# Patient Record
Sex: FEMALE | Race: WHITE | NOT HISPANIC OR LATINO | Employment: OTHER | URBAN - NONMETROPOLITAN AREA
[De-identification: names, ages, dates, MRNs, and addresses within clinical notes are randomized per-mention and may not be internally consistent; named-entity substitution may affect disease eponyms.]

---

## 2022-03-11 ENCOUNTER — HOSPITAL ENCOUNTER (INPATIENT)
Facility: HOSPITAL | Age: 52
LOS: 1 days | Discharge: HOME | End: 2022-03-13
Attending: EMERGENCY MEDICINE | Admitting: SURGERY

## 2022-03-11 ENCOUNTER — APPOINTMENT (OUTPATIENT)
Dept: ULTRASOUND IMAGING | Facility: HOSPITAL | Age: 52
End: 2022-03-11

## 2022-03-11 ENCOUNTER — APPOINTMENT (OUTPATIENT)
Dept: CT IMAGING | Facility: HOSPITAL | Age: 52
End: 2022-03-11

## 2022-03-11 ENCOUNTER — APPOINTMENT (OUTPATIENT)
Dept: GENERAL RADIOLOGY | Facility: HOSPITAL | Age: 52
End: 2022-03-11

## 2022-03-11 DIAGNOSIS — K81.0 ACUTE CHOLECYSTITIS: Primary | ICD-10-CM

## 2022-03-11 LAB
ALBUMIN SERPL-MCNC: 4.39 G/DL (ref 3.5–5.2)
ALBUMIN/GLOB SERPL: 1 G/DL
ALP SERPL-CCNC: 196 U/L (ref 39–117)
ALT SERPL W P-5'-P-CCNC: 36 U/L (ref 1–33)
ANION GAP SERPL CALCULATED.3IONS-SCNC: 14 MMOL/L (ref 5–15)
AST SERPL-CCNC: 34 U/L (ref 1–32)
BASOPHILS # BLD AUTO: 0.02 10*3/MM3 (ref 0–0.2)
BASOPHILS NFR BLD AUTO: 0.2 % (ref 0–1.5)
BILIRUB SERPL-MCNC: 1 MG/DL (ref 0–1.2)
BUN SERPL-MCNC: 11 MG/DL (ref 6–20)
BUN/CREAT SERPL: 14.5 (ref 7–25)
CALCIUM SPEC-SCNC: 9.8 MG/DL (ref 8.6–10.5)
CHLORIDE SERPL-SCNC: 99 MMOL/L (ref 98–107)
CO2 SERPL-SCNC: 24 MMOL/L (ref 22–29)
CREAT SERPL-MCNC: 0.76 MG/DL (ref 0.57–1)
CRP SERPL-MCNC: 13.65 MG/DL (ref 0–0.5)
D DIMER PPP FEU-MCNC: 1.78 MCGFEU/ML (ref 0–0.5)
DEPRECATED RDW RBC AUTO: 41.9 FL (ref 37–54)
EGFRCR SERPLBLD CKD-EPI 2021: 95.6 ML/MIN/1.73
EOSINOPHIL # BLD AUTO: 0.07 10*3/MM3 (ref 0–0.4)
EOSINOPHIL NFR BLD AUTO: 0.6 % (ref 0.3–6.2)
ERYTHROCYTE [DISTWIDTH] IN BLOOD BY AUTOMATED COUNT: 12.8 % (ref 12.3–15.4)
ERYTHROCYTE [SEDIMENTATION RATE] IN BLOOD: 43 MM/HR (ref 0–20)
FLUAV SUBTYP SPEC NAA+PROBE: NOT DETECTED
FLUBV RNA ISLT QL NAA+PROBE: NOT DETECTED
GLOBULIN UR ELPH-MCNC: 4.3 GM/DL
GLUCOSE SERPL-MCNC: 134 MG/DL (ref 65–99)
HCT VFR BLD AUTO: 45.3 % (ref 34–46.6)
HGB BLD-MCNC: 14.8 G/DL (ref 12–15.9)
HOLD SPECIMEN: NORMAL
HOLD SPECIMEN: NORMAL
IMM GRANULOCYTES # BLD AUTO: 0.05 10*3/MM3 (ref 0–0.05)
IMM GRANULOCYTES NFR BLD AUTO: 0.4 % (ref 0–0.5)
LYMPHOCYTES # BLD AUTO: 1.38 10*3/MM3 (ref 0.7–3.1)
LYMPHOCYTES NFR BLD AUTO: 11.8 % (ref 19.6–45.3)
MAGNESIUM SERPL-MCNC: 2.1 MG/DL (ref 1.6–2.6)
MCH RBC QN AUTO: 29.1 PG (ref 26.6–33)
MCHC RBC AUTO-ENTMCNC: 32.7 G/DL (ref 31.5–35.7)
MCV RBC AUTO: 89 FL (ref 79–97)
MONOCYTES # BLD AUTO: 0.74 10*3/MM3 (ref 0.1–0.9)
MONOCYTES NFR BLD AUTO: 6.3 % (ref 5–12)
NEUTROPHILS NFR BLD AUTO: 80.7 % (ref 42.7–76)
NEUTROPHILS NFR BLD AUTO: 9.4 10*3/MM3 (ref 1.7–7)
NRBC BLD AUTO-RTO: 0 /100 WBC (ref 0–0.2)
PLATELET # BLD AUTO: 284 10*3/MM3 (ref 140–450)
PMV BLD AUTO: 10.3 FL (ref 6–12)
POTASSIUM SERPL-SCNC: 3.6 MMOL/L (ref 3.5–5.2)
PROT SERPL-MCNC: 8.7 G/DL (ref 6–8.5)
RBC # BLD AUTO: 5.09 10*6/MM3 (ref 3.77–5.28)
SARS-COV-2 RNA PNL SPEC NAA+PROBE: NOT DETECTED
SODIUM SERPL-SCNC: 137 MMOL/L (ref 136–145)
TROPONIN T SERPL-MCNC: <0.01 NG/ML (ref 0–0.03)
TROPONIN T SERPL-MCNC: <0.01 NG/ML (ref 0–0.03)
TSH SERPL DL<=0.05 MIU/L-ACNC: 0.51 UIU/ML (ref 0.27–4.2)
WBC NRBC COR # BLD: 11.66 10*3/MM3 (ref 3.4–10.8)
WHOLE BLOOD HOLD SPECIMEN: NORMAL
WHOLE BLOOD HOLD SPECIMEN: NORMAL

## 2022-03-11 PROCEDURE — 85379 FIBRIN DEGRADATION QUANT: CPT | Performed by: NURSE PRACTITIONER

## 2022-03-11 PROCEDURE — 71275 CT ANGIOGRAPHY CHEST: CPT

## 2022-03-11 PROCEDURE — 71045 X-RAY EXAM CHEST 1 VIEW: CPT

## 2022-03-11 PROCEDURE — 84484 ASSAY OF TROPONIN QUANT: CPT | Performed by: NURSE PRACTITIONER

## 2022-03-11 PROCEDURE — 25010000002 MORPHINE PER 10 MG: Performed by: NURSE PRACTITIONER

## 2022-03-11 PROCEDURE — 83735 ASSAY OF MAGNESIUM: CPT | Performed by: NURSE PRACTITIONER

## 2022-03-11 PROCEDURE — 86140 C-REACTIVE PROTEIN: CPT | Performed by: NURSE PRACTITIONER

## 2022-03-11 PROCEDURE — 0 IOPAMIDOL PER 1 ML: Performed by: EMERGENCY MEDICINE

## 2022-03-11 PROCEDURE — 93005 ELECTROCARDIOGRAM TRACING: CPT | Performed by: EMERGENCY MEDICINE

## 2022-03-11 PROCEDURE — 25010000002 ONDANSETRON PER 1 MG: Performed by: NURSE PRACTITIONER

## 2022-03-11 PROCEDURE — 99284 EMERGENCY DEPT VISIT MOD MDM: CPT

## 2022-03-11 PROCEDURE — 80050 GENERAL HEALTH PANEL: CPT | Performed by: NURSE PRACTITIONER

## 2022-03-11 PROCEDURE — 93005 ELECTROCARDIOGRAM TRACING: CPT | Performed by: FAMILY MEDICINE

## 2022-03-11 PROCEDURE — 84436 ASSAY OF TOTAL THYROXINE: CPT | Performed by: NURSE PRACTITIONER

## 2022-03-11 PROCEDURE — 87636 SARSCOV2 & INF A&B AMP PRB: CPT | Performed by: NURSE PRACTITIONER

## 2022-03-11 PROCEDURE — 85652 RBC SED RATE AUTOMATED: CPT | Performed by: NURSE PRACTITIONER

## 2022-03-11 PROCEDURE — 76705 ECHO EXAM OF ABDOMEN: CPT

## 2022-03-11 RX ORDER — MORPHINE SULFATE 2 MG/ML
2 INJECTION, SOLUTION INTRAMUSCULAR; INTRAVENOUS ONCE
Status: COMPLETED | OUTPATIENT
Start: 2022-03-11 | End: 2022-03-11

## 2022-03-11 RX ORDER — SODIUM CHLORIDE 0.9 % (FLUSH) 0.9 %
10 SYRINGE (ML) INJECTION AS NEEDED
Status: DISCONTINUED | OUTPATIENT
Start: 2022-03-11 | End: 2022-03-13 | Stop reason: HOSPADM

## 2022-03-11 RX ORDER — ONDANSETRON 2 MG/ML
4 INJECTION INTRAMUSCULAR; INTRAVENOUS ONCE
Status: COMPLETED | OUTPATIENT
Start: 2022-03-11 | End: 2022-03-11

## 2022-03-11 RX ORDER — ASPIRIN 81 MG/1
324 TABLET, CHEWABLE ORAL ONCE
Status: COMPLETED | OUTPATIENT
Start: 2022-03-11 | End: 2022-03-11

## 2022-03-11 RX ADMIN — MORPHINE SULFATE 2 MG: 2 INJECTION, SOLUTION INTRAMUSCULAR; INTRAVENOUS at 22:36

## 2022-03-11 RX ADMIN — ASPIRIN 81 MG: 81 TABLET, CHEWABLE ORAL at 20:35

## 2022-03-11 RX ADMIN — ONDANSETRON 4 MG: 2 INJECTION INTRAMUSCULAR; INTRAVENOUS at 22:36

## 2022-03-11 RX ADMIN — IOPAMIDOL 70 ML: 755 INJECTION, SOLUTION INTRAVENOUS at 22:19

## 2022-03-12 ENCOUNTER — ANESTHESIA (OUTPATIENT)
Dept: PERIOP | Facility: HOSPITAL | Age: 52
End: 2022-03-12

## 2022-03-12 ENCOUNTER — APPOINTMENT (OUTPATIENT)
Dept: GENERAL RADIOLOGY | Facility: HOSPITAL | Age: 52
End: 2022-03-12

## 2022-03-12 ENCOUNTER — ANESTHESIA EVENT (OUTPATIENT)
Dept: PERIOP | Facility: HOSPITAL | Age: 52
End: 2022-03-12

## 2022-03-12 PROBLEM — K81.0 ACUTE CHOLECYSTITIS: Status: ACTIVE | Noted: 2022-03-12

## 2022-03-12 LAB
ABO GROUP BLD: NORMAL
ABO GROUP BLD: NORMAL
BLD GP AB SCN SERPL QL: NEGATIVE
QT INTERVAL: 370 MS
QTC INTERVAL: 464 MS
RH BLD: POSITIVE
RH BLD: POSITIVE
T&S EXPIRATION DATE: NORMAL
T4 SERPL-MCNC: 8.69 MCG/DL (ref 4.5–11.7)

## 2022-03-12 PROCEDURE — 86900 BLOOD TYPING SEROLOGIC ABO: CPT

## 2022-03-12 PROCEDURE — C9290 INJ, BUPIVACAINE LIPOSOME: HCPCS | Performed by: NURSE ANESTHETIST, CERTIFIED REGISTERED

## 2022-03-12 PROCEDURE — C1889 IMPLANT/INSERT DEVICE, NOC: HCPCS | Performed by: SURGERY

## 2022-03-12 PROCEDURE — 94799 UNLISTED PULMONARY SVC/PX: CPT

## 2022-03-12 PROCEDURE — 25010000002 DEXAMETHASONE PER 1 MG: Performed by: NURSE ANESTHETIST, CERTIFIED REGISTERED

## 2022-03-12 PROCEDURE — 25010000002 PROPOFOL 10 MG/ML EMULSION: Performed by: NURSE ANESTHETIST, CERTIFIED REGISTERED

## 2022-03-12 PROCEDURE — 25010000002 PIPERACILLIN SOD-TAZOBACTAM PER 1 G: Performed by: SURGERY

## 2022-03-12 PROCEDURE — 86901 BLOOD TYPING SEROLOGIC RH(D): CPT

## 2022-03-12 PROCEDURE — 25010000002 FENTANYL CITRATE (PF) 50 MCG/ML SOLUTION: Performed by: NURSE ANESTHETIST, CERTIFIED REGISTERED

## 2022-03-12 PROCEDURE — 47562 LAPAROSCOPIC CHOLECYSTECTOMY: CPT | Performed by: SURGERY

## 2022-03-12 PROCEDURE — 25010000002 HYDROMORPHONE 1 MG/ML SOLUTION: Performed by: NURSE PRACTITIONER

## 2022-03-12 PROCEDURE — 99222 1ST HOSP IP/OBS MODERATE 55: CPT | Performed by: SURGERY

## 2022-03-12 PROCEDURE — 25010000002 PROCHLORPERAZINE 10 MG/2ML SOLUTION: Performed by: NURSE PRACTITIONER

## 2022-03-12 PROCEDURE — 86850 RBC ANTIBODY SCREEN: CPT | Performed by: NURSE PRACTITIONER

## 2022-03-12 PROCEDURE — 0 CEFAZOLIN SODIUM-DEXTROSE 2-3 GM-%(50ML) RECONSTITUTED SOLUTION: Performed by: SURGERY

## 2022-03-12 PROCEDURE — 25010000002 ONDANSETRON PER 1 MG: Performed by: NURSE PRACTITIONER

## 2022-03-12 PROCEDURE — 25010000002 KETOROLAC TROMETHAMINE PER 15 MG: Performed by: SURGERY

## 2022-03-12 PROCEDURE — 0 BUPIVACAINE LIPOSOME 1.3 % SUSPENSION: Performed by: NURSE ANESTHETIST, CERTIFIED REGISTERED

## 2022-03-12 PROCEDURE — 94640 AIRWAY INHALATION TREATMENT: CPT

## 2022-03-12 PROCEDURE — 25010000002 MORPHINE PER 10 MG: Performed by: NURSE PRACTITIONER

## 2022-03-12 PROCEDURE — 86901 BLOOD TYPING SEROLOGIC RH(D): CPT | Performed by: NURSE PRACTITIONER

## 2022-03-12 PROCEDURE — 88304 TISSUE EXAM BY PATHOLOGIST: CPT | Performed by: SURGERY

## 2022-03-12 PROCEDURE — 0FT44ZZ RESECTION OF GALLBLADDER, PERCUTANEOUS ENDOSCOPIC APPROACH: ICD-10-PCS | Performed by: SURGERY

## 2022-03-12 PROCEDURE — 25010000002 NEOSTIGMINE 10 MG/10ML SOLUTION: Performed by: NURSE ANESTHETIST, CERTIFIED REGISTERED

## 2022-03-12 PROCEDURE — 86900 BLOOD TYPING SEROLOGIC ABO: CPT | Performed by: NURSE PRACTITIONER

## 2022-03-12 DEVICE — CLIP APPLIER
Type: IMPLANTABLE DEVICE | Site: ABDOMEN | Status: FUNCTIONAL
Brand: ENDO CLIP

## 2022-03-12 DEVICE — LIGACLIP 10-M/L, 10MM ENDOSCOPIC ROTATING MULTIPLE CLIP APPLIERS
Type: IMPLANTABLE DEVICE | Site: ABDOMEN | Status: FUNCTIONAL
Brand: LIGACLIP

## 2022-03-12 DEVICE — THE ECHELON, ECHELON ENDOPATH™ AND ECHELON FLEX™ FAMILIES OF ENDOSCOPIC LINEAR CUTTERS AND RELOADS ARE STERILE, SINGLE PATIENT USE INSTRUMENTS THAT SIMULTANEOUSLY CUT AND STAPLE TISSUE. THERE ARE SIX STAGGERED ROWS OF STAPLES, THREE ON EITHER SIDE OF THE CUT LINE. THE 45 MM INSTRUMENTS HAVE A STAPLE LINE THATIS APPROXIMATELY 45 MM LONG AND A CUT LINE THAT IS APPROXIMATELY 42 MM LONG. THE SHAFT CAN ROTATE FREELY IN BOTH DIRECTIONS AND AN ARTICULATION MECHANISM ON ARTICULATING INSTRUMENTS ENABLES BENDING THE DISTAL PORTIONOF THE SHAFT TO FACILITATE LATERAL ACCESS OF THE OPERATIVE SITE.THE INSTRUMENTS ARE SHIPPED WITHOUT A RELOAD AND MUST BE LOADED PRIOR TO USE. A STAPLE RETAINING CAP ON THE RELOAD PROTECTS THE STAPLE LEG POINTS DURING SHIPPING AND TRANSPORTATION. THE INSTRUMENTS’ LOCK-OUT FEATURE IS DESIGNED TO PREVENT A USED RELOAD FROM BEING REFIRED.
Type: IMPLANTABLE DEVICE | Site: ABDOMEN | Status: FUNCTIONAL
Brand: ECHELON ENDOPATH

## 2022-03-12 RX ORDER — SODIUM CHLORIDE 0.9 % (FLUSH) 0.9 %
10 SYRINGE (ML) INJECTION EVERY 12 HOURS SCHEDULED
Status: DISCONTINUED | OUTPATIENT
Start: 2022-03-12 | End: 2022-03-13 | Stop reason: HOSPADM

## 2022-03-12 RX ORDER — IPRATROPIUM BROMIDE AND ALBUTEROL SULFATE 2.5; .5 MG/3ML; MG/3ML
3 SOLUTION RESPIRATORY (INHALATION) ONCE AS NEEDED
Status: COMPLETED | OUTPATIENT
Start: 2022-03-12 | End: 2022-03-12

## 2022-03-12 RX ORDER — DEXAMETHASONE SODIUM PHOSPHATE 4 MG/ML
INJECTION, SOLUTION INTRA-ARTICULAR; INTRALESIONAL; INTRAMUSCULAR; INTRAVENOUS; SOFT TISSUE AS NEEDED
Status: DISCONTINUED | OUTPATIENT
Start: 2022-03-12 | End: 2022-03-12 | Stop reason: SURG

## 2022-03-12 RX ORDER — KETOROLAC TROMETHAMINE 30 MG/ML
15 INJECTION, SOLUTION INTRAMUSCULAR; INTRAVENOUS EVERY 6 HOURS
Status: DISCONTINUED | OUTPATIENT
Start: 2022-03-12 | End: 2022-03-13 | Stop reason: HOSPADM

## 2022-03-12 RX ORDER — NEOSTIGMINE METHYLSULFATE 1 MG/ML
INJECTION, SOLUTION INTRAVENOUS AS NEEDED
Status: DISCONTINUED | OUTPATIENT
Start: 2022-03-12 | End: 2022-03-12 | Stop reason: SURG

## 2022-03-12 RX ORDER — FLUOXETINE HYDROCHLORIDE 40 MG/1
40 CAPSULE ORAL DAILY
COMMUNITY

## 2022-03-12 RX ORDER — DROPERIDOL 2.5 MG/ML
0.62 INJECTION, SOLUTION INTRAMUSCULAR; INTRAVENOUS ONCE AS NEEDED
Status: DISCONTINUED | OUTPATIENT
Start: 2022-03-12 | End: 2022-03-12 | Stop reason: HOSPADM

## 2022-03-12 RX ORDER — MAGNESIUM HYDROXIDE 1200 MG/15ML
LIQUID ORAL AS NEEDED
Status: DISCONTINUED | OUTPATIENT
Start: 2022-03-12 | End: 2022-03-12 | Stop reason: HOSPADM

## 2022-03-12 RX ORDER — SODIUM CHLORIDE 9 MG/ML
INJECTION, SOLUTION INTRAVENOUS CONTINUOUS PRN
Status: COMPLETED | OUTPATIENT
Start: 2022-03-12 | End: 2022-03-12

## 2022-03-12 RX ORDER — PROPOFOL 10 MG/ML
VIAL (ML) INTRAVENOUS AS NEEDED
Status: DISCONTINUED | OUTPATIENT
Start: 2022-03-12 | End: 2022-03-12 | Stop reason: SURG

## 2022-03-12 RX ORDER — FENTANYL CITRATE 50 UG/ML
50 INJECTION, SOLUTION INTRAMUSCULAR; INTRAVENOUS
Status: DISCONTINUED | OUTPATIENT
Start: 2022-03-12 | End: 2022-03-12 | Stop reason: HOSPADM

## 2022-03-12 RX ORDER — VECURONIUM BROMIDE 1 MG/ML
INJECTION, POWDER, LYOPHILIZED, FOR SOLUTION INTRAVENOUS AS NEEDED
Status: DISCONTINUED | OUTPATIENT
Start: 2022-03-12 | End: 2022-03-12 | Stop reason: SURG

## 2022-03-12 RX ORDER — SODIUM CHLORIDE, SODIUM LACTATE, POTASSIUM CHLORIDE, CALCIUM CHLORIDE 600; 310; 30; 20 MG/100ML; MG/100ML; MG/100ML; MG/100ML
100 INJECTION, SOLUTION INTRAVENOUS ONCE AS NEEDED
Status: DISCONTINUED | OUTPATIENT
Start: 2022-03-12 | End: 2022-03-12 | Stop reason: HOSPADM

## 2022-03-12 RX ORDER — MIRTAZAPINE 15 MG/1
30 TABLET, FILM COATED ORAL NIGHTLY
Status: DISCONTINUED | OUTPATIENT
Start: 2022-03-12 | End: 2022-03-13 | Stop reason: HOSPADM

## 2022-03-12 RX ORDER — CEFAZOLIN SODIUM 2 G/50ML
2 SOLUTION INTRAVENOUS
Status: COMPLETED | OUTPATIENT
Start: 2022-03-13 | End: 2022-03-12

## 2022-03-12 RX ORDER — SODIUM CHLORIDE, SODIUM LACTATE, POTASSIUM CHLORIDE, CALCIUM CHLORIDE 600; 310; 30; 20 MG/100ML; MG/100ML; MG/100ML; MG/100ML
INJECTION, SOLUTION INTRAVENOUS CONTINUOUS PRN
Status: DISCONTINUED | OUTPATIENT
Start: 2022-03-12 | End: 2022-03-12 | Stop reason: SURG

## 2022-03-12 RX ORDER — FAMOTIDINE 20 MG/1
20 TABLET, FILM COATED ORAL
Status: DISCONTINUED | OUTPATIENT
Start: 2022-03-12 | End: 2022-03-13 | Stop reason: HOSPADM

## 2022-03-12 RX ORDER — LIDOCAINE HYDROCHLORIDE 20 MG/ML
INJECTION, SOLUTION INFILTRATION; PERINEURAL AS NEEDED
Status: DISCONTINUED | OUTPATIENT
Start: 2022-03-12 | End: 2022-03-12 | Stop reason: SURG

## 2022-03-12 RX ORDER — ROCURONIUM BROMIDE 10 MG/ML
INJECTION, SOLUTION INTRAVENOUS AS NEEDED
Status: DISCONTINUED | OUTPATIENT
Start: 2022-03-12 | End: 2022-03-12 | Stop reason: SURG

## 2022-03-12 RX ORDER — OXYCODONE HYDROCHLORIDE AND ACETAMINOPHEN 5; 325 MG/1; MG/1
1 TABLET ORAL ONCE AS NEEDED
Status: DISCONTINUED | OUTPATIENT
Start: 2022-03-12 | End: 2022-03-12 | Stop reason: HOSPADM

## 2022-03-12 RX ORDER — KETOROLAC TROMETHAMINE 30 MG/ML
30 INJECTION, SOLUTION INTRAMUSCULAR; INTRAVENOUS EVERY 6 HOURS PRN
Status: DISCONTINUED | OUTPATIENT
Start: 2022-03-12 | End: 2022-03-12 | Stop reason: HOSPADM

## 2022-03-12 RX ORDER — PROCHLORPERAZINE EDISYLATE 5 MG/ML
5 INJECTION INTRAMUSCULAR; INTRAVENOUS ONCE
Status: COMPLETED | OUTPATIENT
Start: 2022-03-12 | End: 2022-03-12

## 2022-03-12 RX ORDER — MEPERIDINE HYDROCHLORIDE 25 MG/ML
12.5 INJECTION INTRAMUSCULAR; INTRAVENOUS; SUBCUTANEOUS
Status: DISCONTINUED | OUTPATIENT
Start: 2022-03-12 | End: 2022-03-12 | Stop reason: HOSPADM

## 2022-03-12 RX ORDER — POLYETHYLENE GLYCOL 3350 17 G/17G
17 POWDER, FOR SOLUTION ORAL 2 TIMES DAILY
Status: DISCONTINUED | OUTPATIENT
Start: 2022-03-12 | End: 2022-03-13 | Stop reason: HOSPADM

## 2022-03-12 RX ORDER — GLYCOPYRROLATE 0.2 MG/ML
INJECTION INTRAMUSCULAR; INTRAVENOUS AS NEEDED
Status: DISCONTINUED | OUTPATIENT
Start: 2022-03-12 | End: 2022-03-12 | Stop reason: SURG

## 2022-03-12 RX ORDER — MORPHINE SULFATE 2 MG/ML
2 INJECTION, SOLUTION INTRAMUSCULAR; INTRAVENOUS
Status: DISCONTINUED | OUTPATIENT
Start: 2022-03-12 | End: 2022-03-13 | Stop reason: HOSPADM

## 2022-03-12 RX ORDER — HEPARIN SODIUM 5000 [USP'U]/ML
5000 INJECTION, SOLUTION INTRAVENOUS; SUBCUTANEOUS EVERY 12 HOURS SCHEDULED
Status: DISCONTINUED | OUTPATIENT
Start: 2022-03-13 | End: 2022-03-13 | Stop reason: HOSPADM

## 2022-03-12 RX ORDER — ACETAMINOPHEN 500 MG
1000 TABLET ORAL EVERY 6 HOURS
Status: DISCONTINUED | OUTPATIENT
Start: 2022-03-12 | End: 2022-03-13 | Stop reason: HOSPADM

## 2022-03-12 RX ORDER — SODIUM CHLORIDE 0.9 % (FLUSH) 0.9 %
3 SYRINGE (ML) INJECTION EVERY 12 HOURS SCHEDULED
Status: DISCONTINUED | OUTPATIENT
Start: 2022-03-12 | End: 2022-03-13 | Stop reason: HOSPADM

## 2022-03-12 RX ORDER — BUPIVACAINE HYDROCHLORIDE 5 MG/ML
INJECTION, SOLUTION EPIDURAL; INTRACAUDAL
Status: COMPLETED | OUTPATIENT
Start: 2022-03-12 | End: 2022-03-12

## 2022-03-12 RX ORDER — LABETALOL HYDROCHLORIDE 5 MG/ML
INJECTION, SOLUTION INTRAVENOUS AS NEEDED
Status: DISCONTINUED | OUTPATIENT
Start: 2022-03-12 | End: 2022-03-12 | Stop reason: SURG

## 2022-03-12 RX ORDER — MORPHINE SULFATE 2 MG/ML
1 INJECTION, SOLUTION INTRAMUSCULAR; INTRAVENOUS EVERY 4 HOURS PRN
Status: DISCONTINUED | OUTPATIENT
Start: 2022-03-12 | End: 2022-03-13 | Stop reason: HOSPADM

## 2022-03-12 RX ORDER — SODIUM CHLORIDE 0.9 % (FLUSH) 0.9 %
10 SYRINGE (ML) INJECTION AS NEEDED
Status: DISCONTINUED | OUTPATIENT
Start: 2022-03-12 | End: 2022-03-13 | Stop reason: HOSPADM

## 2022-03-12 RX ORDER — ONDANSETRON 2 MG/ML
4 INJECTION INTRAMUSCULAR; INTRAVENOUS EVERY 4 HOURS PRN
Status: DISCONTINUED | OUTPATIENT
Start: 2022-03-12 | End: 2022-03-13 | Stop reason: HOSPADM

## 2022-03-12 RX ORDER — ACETAMINOPHEN 325 MG/1
650 TABLET ORAL EVERY 4 HOURS PRN
Status: DISCONTINUED | OUTPATIENT
Start: 2022-03-12 | End: 2022-03-13 | Stop reason: HOSPADM

## 2022-03-12 RX ORDER — MIRTAZAPINE 30 MG/1
30 TABLET, FILM COATED ORAL NIGHTLY
COMMUNITY

## 2022-03-12 RX ORDER — ONDANSETRON 4 MG/1
4 TABLET, FILM COATED ORAL EVERY 6 HOURS PRN
Status: DISCONTINUED | OUTPATIENT
Start: 2022-03-12 | End: 2022-03-12

## 2022-03-12 RX ORDER — OXYCODONE HYDROCHLORIDE 5 MG/1
5 TABLET ORAL EVERY 4 HOURS PRN
Status: DISCONTINUED | OUTPATIENT
Start: 2022-03-12 | End: 2022-03-13 | Stop reason: HOSPADM

## 2022-03-12 RX ORDER — FLUOXETINE HYDROCHLORIDE 20 MG/1
40 CAPSULE ORAL DAILY
Status: DISCONTINUED | OUTPATIENT
Start: 2022-03-12 | End: 2022-03-13 | Stop reason: HOSPADM

## 2022-03-12 RX ORDER — ONDANSETRON 2 MG/ML
4 INJECTION INTRAMUSCULAR; INTRAVENOUS AS NEEDED
Status: DISCONTINUED | OUTPATIENT
Start: 2022-03-12 | End: 2022-03-12 | Stop reason: HOSPADM

## 2022-03-12 RX ORDER — FAMOTIDINE 10 MG/ML
INJECTION, SOLUTION INTRAVENOUS AS NEEDED
Status: DISCONTINUED | OUTPATIENT
Start: 2022-03-12 | End: 2022-03-12 | Stop reason: SURG

## 2022-03-12 RX ORDER — FENTANYL CITRATE 50 UG/ML
INJECTION, SOLUTION INTRAMUSCULAR; INTRAVENOUS AS NEEDED
Status: DISCONTINUED | OUTPATIENT
Start: 2022-03-12 | End: 2022-03-12 | Stop reason: SURG

## 2022-03-12 RX ORDER — NALOXONE HCL 0.4 MG/ML
0.4 VIAL (ML) INJECTION
Status: DISCONTINUED | OUTPATIENT
Start: 2022-03-12 | End: 2022-03-13 | Stop reason: HOSPADM

## 2022-03-12 RX ORDER — ONDANSETRON 2 MG/ML
4 INJECTION INTRAMUSCULAR; INTRAVENOUS EVERY 6 HOURS PRN
Status: DISCONTINUED | OUTPATIENT
Start: 2022-03-12 | End: 2022-03-12

## 2022-03-12 RX ORDER — SODIUM CHLORIDE, SODIUM LACTATE, POTASSIUM CHLORIDE, CALCIUM CHLORIDE 600; 310; 30; 20 MG/100ML; MG/100ML; MG/100ML; MG/100ML
100 INJECTION, SOLUTION INTRAVENOUS CONTINUOUS
Status: DISCONTINUED | OUTPATIENT
Start: 2022-03-12 | End: 2022-03-13 | Stop reason: HOSPADM

## 2022-03-12 RX ADMIN — LABETALOL HYDROCHLORIDE 5 MG: 5 INJECTION, SOLUTION INTRAVENOUS at 14:12

## 2022-03-12 RX ADMIN — Medication 3 ML: at 19:56

## 2022-03-12 RX ADMIN — PROPOFOL 150 MG: 10 INJECTION, EMULSION INTRAVENOUS at 11:59

## 2022-03-12 RX ADMIN — CEFAZOLIN SODIUM 2 G: 2 SOLUTION INTRAVENOUS at 11:55

## 2022-03-12 RX ADMIN — BUPIVACAINE HYDROCHLORIDE 40 ML: 5 INJECTION, SOLUTION EPIDURAL; INTRACAUDAL; PERINEURAL at 12:04

## 2022-03-12 RX ADMIN — HYDROMORPHONE HYDROCHLORIDE 1 MG: 1 INJECTION, SOLUTION INTRAMUSCULAR; INTRAVENOUS; SUBCUTANEOUS at 00:24

## 2022-03-12 RX ADMIN — FAMOTIDINE 20 MG: 20 TABLET, FILM COATED ORAL at 17:52

## 2022-03-12 RX ADMIN — ROCURONIUM BROMIDE 50 MG: 10 SOLUTION INTRAVENOUS at 11:59

## 2022-03-12 RX ADMIN — DEXAMETHASONE SODIUM PHOSPHATE 8 MG: 4 INJECTION, SOLUTION INTRA-ARTICULAR; INTRALESIONAL; INTRAMUSCULAR; INTRAVENOUS; SOFT TISSUE at 12:15

## 2022-03-12 RX ADMIN — LABETALOL HYDROCHLORIDE 5 MG: 5 INJECTION, SOLUTION INTRAVENOUS at 13:14

## 2022-03-12 RX ADMIN — BUPIVACAINE 266 MG: 13.3 INJECTION, SUSPENSION, LIPOSOMAL INFILTRATION at 12:04

## 2022-03-12 RX ADMIN — Medication 10 ML: at 00:32

## 2022-03-12 RX ADMIN — LABETALOL HYDROCHLORIDE 10 MG: 5 INJECTION, SOLUTION INTRAVENOUS at 12:11

## 2022-03-12 RX ADMIN — ONDANSETRON 4 MG: 2 INJECTION INTRAMUSCULAR; INTRAVENOUS at 12:15

## 2022-03-12 RX ADMIN — Medication 10 ML: at 19:55

## 2022-03-12 RX ADMIN — SODIUM CHLORIDE, POTASSIUM CHLORIDE, SODIUM LACTATE AND CALCIUM CHLORIDE: 600; 310; 30; 20 INJECTION, SOLUTION INTRAVENOUS at 11:55

## 2022-03-12 RX ADMIN — KETOROLAC TROMETHAMINE 15 MG: 30 INJECTION, SOLUTION INTRAMUSCULAR; INTRAVENOUS at 19:55

## 2022-03-12 RX ADMIN — NEOSTIGMINE 4 MG: 1 INJECTION INTRAVENOUS at 14:05

## 2022-03-12 RX ADMIN — Medication 3 ML: at 15:17

## 2022-03-12 RX ADMIN — SODIUM CHLORIDE, POTASSIUM CHLORIDE, SODIUM LACTATE AND CALCIUM CHLORIDE 100 ML/HR: 600; 310; 30; 20 INJECTION, SOLUTION INTRAVENOUS at 15:17

## 2022-03-12 RX ADMIN — MORPHINE SULFATE 1 MG: 2 INJECTION, SOLUTION INTRAMUSCULAR; INTRAVENOUS at 08:54

## 2022-03-12 RX ADMIN — FENTANYL CITRATE 100 MCG: 50 INJECTION INTRAMUSCULAR; INTRAVENOUS at 11:55

## 2022-03-12 RX ADMIN — POLYETHYLENE GLYCOL (3350) 17 G: 17 POWDER, FOR SOLUTION ORAL at 19:55

## 2022-03-12 RX ADMIN — LIDOCAINE HYDROCHLORIDE 100 MG: 20 INJECTION, SOLUTION INFILTRATION; PERINEURAL at 11:59

## 2022-03-12 RX ADMIN — SODIUM CHLORIDE 3.38 G: 9 INJECTION, SOLUTION INTRAVENOUS at 19:55

## 2022-03-12 RX ADMIN — MORPHINE SULFATE 1 MG: 2 INJECTION, SOLUTION INTRAMUSCULAR; INTRAVENOUS at 05:14

## 2022-03-12 RX ADMIN — Medication 10 ML: at 08:54

## 2022-03-12 RX ADMIN — KETOROLAC TROMETHAMINE 15 MG: 30 INJECTION, SOLUTION INTRAMUSCULAR; INTRAVENOUS at 15:20

## 2022-03-12 RX ADMIN — MIRTAZAPINE 30 MG: 15 TABLET, FILM COATED ORAL at 19:54

## 2022-03-12 RX ADMIN — IPRATROPIUM BROMIDE AND ALBUTEROL SULFATE 3 ML: .5; 2.5 SOLUTION RESPIRATORY (INHALATION) at 14:32

## 2022-03-12 RX ADMIN — ONDANSETRON 4 MG: 2 INJECTION INTRAMUSCULAR; INTRAVENOUS at 03:11

## 2022-03-12 RX ADMIN — PROCHLORPERAZINE EDISYLATE 5 MG: 5 INJECTION INTRAMUSCULAR; INTRAVENOUS at 00:24

## 2022-03-12 RX ADMIN — VECURONIUM BROMIDE 2 MG: 1 INJECTION, POWDER, LYOPHILIZED, FOR SOLUTION INTRAVENOUS at 13:18

## 2022-03-12 RX ADMIN — FAMOTIDINE 20 MG: 10 INJECTION INTRAVENOUS at 12:15

## 2022-03-12 RX ADMIN — MORPHINE SULFATE 1 MG: 2 INJECTION, SOLUTION INTRAMUSCULAR; INTRAVENOUS at 01:42

## 2022-03-12 RX ADMIN — ACETAMINOPHEN 1000 MG: 500 TABLET ORAL at 15:20

## 2022-03-12 RX ADMIN — GLYCOPYRROLATE 0.6 MG: 0.2 INJECTION INTRAMUSCULAR; INTRAVENOUS at 14:05

## 2022-03-12 RX ADMIN — ACETAMINOPHEN 1000 MG: 500 TABLET ORAL at 19:55

## 2022-03-12 NOTE — ANESTHESIA PROCEDURE NOTES
Peripheral Block      Patient reassessed immediately prior to procedure    Patient location during procedure: OR  Start time: 3/12/2022 12:04 PM  Stop time: 3/12/2022 12:11 PM  Reason for block: at surgeon's request and post-op pain management  Performed by  Anesthesiologist: Андрей Limon DO  CRNA: Naa Garcia CRNA  Preanesthetic Checklist  Completed: patient identified, IV checked, site marked, risks and benefits discussed, surgical consent, monitors and equipment checked, pre-op evaluation and timeout performed  Prep:  Pt Position: supine  Sterile barriers:cap, gloves, sterile barriers and mask  Prep: ChloraPrep  Patient monitoring: blood pressure monitoring, continuous pulse oximetry and EKG  Procedure    Sedation: yes  Performed under: general  Guidance:ultrasound guided    ULTRASOUND INTERPRETATION. Using ultrasound guidance a 20 G gauge needle was placed in close proximity to the nerve, at which point, under ultrasound guidance anesthetic was injected in the area of the nerve and spread of the anesthesia was seen on ultrasound in close proximity thereto.  There were no abnormalities seen on ultrasound; a digital image was taken; and the patient tolerated the procedure with no complications. Images:still images obtained, printed/placed on chart    Laterality:Bilateral  Block Type:TAP  Injection Technique:single-shot  Needle Type:short-bevel and echogenic  Needle Gauge:20 G  Resistance on Injection: none    Medications Used: bupivacaine PF (MARCAINE) injection 0.5%, 40 mL  Med administered at 3/12/2022 12:04 PM      Medications  Preservative Free Saline:20ml  Comment:Block Injection:  LA dose divided between Right and Left block        Post Assessment  Injection Assessment: negative aspiration for heme, incremental injection and no paresthesia on injection  Patient Tolerance:comfortable throughout block  Complications:no  Additional Notes  SC:    Under Ultrasound guidance, a Denise 4inch 360 degree  needle was advanced.  The Rectus Abdominous and Transversus Abdominus muscles where visualized.  At or before the aponeurosis of Transversus Abdominous, local anesthetic spread was visualized in the Transversus Abdominus Plane. Injection was made incrementally with aspiration every 5 mls.  There was no  intravascular injection,  injection pressure was normal, there was no neural injection, and the procedure was completed without difficulty.  Thank You.            Mid ax:  Under Ultrasound guidance, a BBraun 4inch 360 degree needle was advanced.  The Internal Oblique and Transversus Abdominus muscles where visualized.  At or before the aponeurosis of Internal Oblique, local anesthetic spread was visualized in the Transversus Abdominus Plane. Injection was made incrementally with aspiration every 5 mls.  There was no  intravascular injection,  injection pressure was normal, there was no neural injection, and the procedure was completed without difficulty.  Thank You.

## 2022-03-12 NOTE — ANESTHESIA PREPROCEDURE EVALUATION
Anesthesia Evaluation     Patient summary reviewed and Nursing notes reviewed   no history of anesthetic complications:  NPO Solid Status: > 8 hours  NPO Liquid Status: > 8 hours           Airway   Mallampati: II  TM distance: >3 FB  Neck ROM: full  No difficulty expected  Dental    (+) poor dentition    Pulmonary - normal exam    breath sounds clear to auscultation  (+) a smoker Current Abstained day of surgery,   Cardiovascular - negative cardio ROS and normal exam    ECG reviewed  Rhythm: regular  Rate: normal        Neuro/Psych  (+) psychiatric history Depression,    GI/Hepatic/Renal/Endo    (+) obesity, morbid obesity,      Musculoskeletal (-) negative ROS    Abdominal   (+) obese,    Substance History - negative use     OB/GYN negative ob/gyn ROS         Other - negative ROS                     Anesthesia Plan    ASA 2     general and general with block   (TAP blocks for post op pain control per surgeon request. )  intravenous induction     Anesthetic plan, all risks, benefits, and alternatives have been provided, discussed and informed consent has been obtained with: patient.    Plan discussed with CRNA.        CODE STATUS:    Level Of Support Discussed With: Patient  Code Status (Patient has no pulse and is not breathing): CPR (Attempt to Resuscitate)  Medical Interventions (Patient has pulse or is breathing): Full Support

## 2022-03-12 NOTE — H&P
Chief complaint cholelithiasis, cholecystitis    Subjective     Patient is a 51 y.o. female who presented to the ED with complaints of epigastric pain and right upper quadrant pain with nausea.  Work-up demonstrated noncardiac source.  CT and ultrasound were compatible with acute cholecystitis with cholelithiasis.  Patient was not aware that she had gallstones.      Review of Systems  Review of Systems - General ROS: negative for - weight loss  Psychological ROS: negative for - behavioral disorder  Ophthalmic ROS: negative for - dry eyes  ENT ROS: negative for - vertigo or vocal changes  Hematological and Lymphatic ROS: negative for - jaundice or swollen lymph nodes  Respiratory ROS: negative for - sputum changes or stridor, shortness of breath  Cardiovascular ROS: negative for - irregular heartbeat or murmur, chest pain  Gastrointestinal ROS: negative for - blood in stools or change in stools  Genitourinary ROS: negative for - hematuria or incontinence  Musculoskeletal ROS: negative for - gait disturbance      History  Past Medical History:   Diagnosis Date   • Depression    Negative for hypertension, diabetes  Past Surgical History:   Procedure Laterality Date   • APPENDECTOMY       No family history on file.  Social History     Tobacco Use   • Smoking status: Current Every Day Smoker     Packs/day: 1.00     Years: 15.00     Pack years: 15.00     Types: Cigarettes   • Smokeless tobacco: Never Used   Vaping Use   • Vaping Use: Never used   Substance Use Topics   • Alcohol use: Never   • Drug use: Never     Medications Prior to Admission   Medication Sig Dispense Refill Last Dose   • FLUoxetine (PROzac) 40 MG capsule Take 40 mg by mouth Daily.   Past Week at Unknown time   • mirtazapine (REMERON) 30 MG tablet Take 30 mg by mouth Every Night.   Past Week at Unknown time     Allergies:  Erythromycin    Objective     Vital Signs  Temp:  [98.1 °F (36.7 °C)-99.4 °F (37.4 °C)] 99.4 °F (37.4 °C)  Heart Rate:   [] 110  Resp:  [20] 20  BP: (159-174)/(86-99) 174/99    Physical Exam  General:  This is a WD WN overweight female in no acute distress  Vital signs: Stable, afebrile  HEENT exam:  WNL. Sclerae are anicteric.  EOMI  Neck:  Supple, FROM.  No JVD.  Trachea midline  Lungs:  Respiratory effort normal. Auscultation: Clear, without wheezes, rhonchi, rales  Heart:  Regular rate and rhythm, without murmur, gallop, rub.  No pedal edema  Abdomen: Bowel sounds are present.  Positive Sepulveda sign.  No palpable mass  Musculoskeletal:  Muscle strength/tone is normal.    Psychiatric:  Alert, oriented x 3.  Mood and affect are appropriate  Skin:  Warm with good turgor.  Without rash or lesion  Extremities:  Examination of the extremities revealed no cyanosis, clubbing or edema.    Results Review:   Results from last 7 days   Lab Units 03/11/22  2252 03/11/22 2034   TROPONIN T ng/mL <0.010 <0.010     Results from last 7 days   Lab Units 03/11/22 2034   CRP mg/dL 13.65*   WBC 10*3/mm3 11.66*   HEMOGLOBIN g/dL 14.8   HEMATOCRIT % 45.3   PLATELETS 10*3/mm3 284         Results from last 7 days   Lab Units 03/11/22 2034   SODIUM mmol/L 137   POTASSIUM mmol/L 3.6   MAGNESIUM mg/dL 2.1   CHLORIDE mmol/L 99   CO2 mmol/L 24.0   BUN mg/dL 11   CREATININE mg/dL 0.76   CALCIUM mg/dL 9.8   GLUCOSE mg/dL 134*   ALBUMIN g/dL 4.39   BILIRUBIN mg/dL 1.0   ALK PHOS U/L 196*   AST (SGOT) U/L 34*   ALT (SGPT) U/L 36*   Estimated Creatinine Clearance: 94.2 mL/min (by C-G formula based on SCr of 0.76 mg/dL).  No results found for: AMMONIA      No results found for: BLOODCX  No results found for: URINECX  No results found for: WOUNDCX  No results found for: STOOLCX    Imaging:  Imaging Results (Last 24 Hours)     Procedure Component Value Units Date/Time    US Gallbladder [[id number]] Collected: 03/11/22 2356     Updated: 03/11/22 2358    Narrative:      US Abdomen Ltd    INDICATION:   Upper abdominal chest pain radiating to back and upper abdomen  all day. Abnormal CT scan earlier today suggesting cholecystitis    COMPARISON:   None available.    FINDINGS:  PANCREAS: Visualized portions of the pancreas are within normal limits.     LIVER: Increased echogenicity of the liver is indicative of hepatic steatosis.. No hepatic mass. The intrahepatic bile ducts are normal in caliber. The common duct is normal in size at the jose hepatis measuring 7.6 mm mm. The portal vein has flow into  the liver.    GALLBLADDER: The gallbladder is abnormal. There is minimal fluid surrounding the gallbladder. Wall is thickened measuring up to 8 mm in thickness. The gallbladder filled with sludge and small stones. Patient was tender over the gallbladder        OTHER: No ascites.      Impression:      Findings suggestive of cholecystitis with a distended gallbladder containing sludge and stones. There is wall thickening. Patient was tender over the gallbladder. There is minimal fluid around the gallbladder    Signer Name: Alexandre Luna MD   Signed: 3/11/2022 11:56 PM   Workstation Name: [facility]    [facility]    CT Chest Pulmonary Embolism [[id number]] Collected: 03/11/22 2233     Updated: 03/11/22 2235    Narrative:      CT CHEST PULMONARY EMBOLISM W CONTRAST    INDICATION:   Chest pain. Possible PE.    TECHNIQUE:   CT angiogram of the chest with IV contrast. 3-D reconstructions were obtained and reviewed.   Radiation dose reduction techniques included automated exposure control or exposure modulation based on body size. Count of known CT and cardiac nuc med studies  performed in previous 12 months: 0.     COMPARISON:   Portable chest 3/11/2022    FINDINGS:   Multifocal linear opacities compatible subsegmental atelectasis. No focal pneumonitis. No effusions.    No evidence of pulmonary embolus. Cardiomegaly. Aorta free of dissection or aneurysm. No central mass or adenopathy.    Markedly distended gallbladder with suspected gallbladder wall thickening and  pericholecystic fluid with cholesterol gallstones. Correlate clinically for acute cholecystitis.      Impression:      Mild multifocal atelectasis but no focal air space disease and no evidence of pulmonary embolus.    CT findings concerning for acute cholecystitis.    Signer Name: INDY Burton MD   Signed: 3/11/2022 10:33 PM   Workstation Name: [facility]    [facility]    XR Chest 1 View [[id number]] Collected: 03/11/22 2033     Updated: 03/11/22 2035    Narrative:      CR Chest 1 Vw    INDICATION:   Chest pain.     COMPARISON:    None available.    FINDINGS:  Portable AP view(s) of the chest.  The heart and mediastinal contours are normal. The lungs are clear. No pneumothorax or pleural effusion.      Impression:      No acute cardiopulmonary findings.    Signer Name: INDY Burton MD   Signed: 3/11/2022 8:33 PM   Workstation Name: [facility]    [facility]          CT and ultrasound reports and images were reviewed.  I agree with the assessment      Impression:  Patient Active Problem List   Diagnosis Code   • Acute cholecystitis K81.0       Plan:  Laparoscopic cholecystectomy      Discussion:  The risks of the surgical procedure were discussed.  Options of alternative treatments including no treatment (if applicable) were discussed.  Patient voiced understanding of the above issues and wishes to proceed    Sanaz Jones MD  03/12/22  09:58 EST      Please note that portions of this note were completed with a voice recognition program.

## 2022-03-12 NOTE — ED PROVIDER NOTES
Subjective   Patient is a 51-year-old female with past medical history significant for depression.  Patient presents to the ED today with epigastric pain radiating into her chest into her back.  She reports that the pain started last night and has been persistent.  She reports she has had a fever.  She denies any cough, shortness of breath, diarrhea, dysuria, flank pain, dizziness, headache, syncope/near syncope, focal weakness, numbness/tingling or any other significant complaints.      Abdominal Pain  Pain location:  Epigastric  Pain quality: sharp and stabbing    Pain radiates to:  Chest and back  Pain severity:  Severe  Onset quality:  Sudden  Duration:  1 day  Timing:  Constant  Progression:  Waxing and waning  Context: not awakening from sleep, not diet changes, not eating, not recent illness, not retching, not sick contacts and not suspicious food intake    Relieved by:  Nothing  Associated symptoms: fever, nausea and vomiting    Associated symptoms: no anorexia, no belching, no chest pain, no chills, no constipation, no cough, no diarrhea, no dysuria, no fatigue, no hematemesis, no hematochezia, no hematuria, no shortness of breath and no sore throat        Review of Systems   Constitutional: Positive for fever. Negative for chills and fatigue.   HENT: Negative.  Negative for congestion and sore throat.    Eyes: Negative.    Respiratory: Negative.  Negative for cough and shortness of breath.    Cardiovascular: Negative.  Negative for chest pain.   Gastrointestinal: Positive for abdominal pain, nausea and vomiting. Negative for anorexia, constipation, diarrhea, hematemesis and hematochezia.   Endocrine: Negative.    Genitourinary: Negative.  Negative for dysuria and hematuria.   Musculoskeletal: Positive for back pain.   Skin: Negative.    Allergic/Immunologic: Negative.    Neurological: Negative.    Hematological: Negative.    Psychiatric/Behavioral: Negative.    All other systems reviewed and are  negative.      Past Medical History:   Diagnosis Date   • Depression        Allergies   Allergen Reactions   • Erythromycin GI Intolerance       Past Surgical History:   Procedure Laterality Date   • APPENDECTOMY         No family history on file.    Social History     Socioeconomic History   • Marital status:    Tobacco Use   • Smoking status: Current Every Day Smoker     Packs/day: 1.00     Years: 15.00     Pack years: 15.00     Types: Cigarettes   • Smokeless tobacco: Never Used   Vaping Use   • Vaping Use: Never used   Substance and Sexual Activity   • Alcohol use: Never   • Drug use: Never   • Sexual activity: Defer           Objective   Physical Exam  Vitals and nursing note reviewed.   Constitutional:       General: She is not in acute distress.     Appearance: She is well-developed. She is ill-appearing. She is not diaphoretic.   HENT:      Head: Normocephalic and atraumatic.      Right Ear: External ear normal.      Left Ear: External ear normal.      Nose: Nose normal.   Eyes:      Conjunctiva/sclera: Conjunctivae normal.      Pupils: Pupils are equal, round, and reactive to light.   Neck:      Vascular: No JVD.      Trachea: No tracheal deviation.   Cardiovascular:      Rate and Rhythm: Normal rate and regular rhythm.      Heart sounds: Normal heart sounds. No murmur heard.  Pulmonary:      Effort: Pulmonary effort is normal. No respiratory distress.      Breath sounds: Normal breath sounds. No wheezing.   Abdominal:      General: Bowel sounds are normal.      Palpations: Abdomen is soft.      Tenderness: There is abdominal tenderness in the epigastric area.   Musculoskeletal:         General: No deformity. Normal range of motion.      Cervical back: Normal range of motion and neck supple.   Skin:     General: Skin is warm and dry.      Capillary Refill: Capillary refill takes less than 2 seconds.      Coloration: Skin is not pale.      Findings: No erythema or rash.   Neurological:      General:  No focal deficit present.      Mental Status: She is alert and oriented to person, place, and time.      Cranial Nerves: No cranial nerve deficit.   Psychiatric:         Mood and Affect: Mood normal.         Behavior: Behavior normal.         Thought Content: Thought content normal.         Procedures       Results for orders placed or performed during the hospital encounter of 03/11/22   COVID-19 and FLU A/B PCR - Swab, Nasopharynx    Specimen: Nasopharynx; Swab   Result Value Ref Range    COVID19 Not Detected Not Detected - Ref. Range    Influenza A PCR Not Detected Not Detected    Influenza B PCR Not Detected Not Detected   Comprehensive Metabolic Panel    Specimen: Arm, Left; Blood   Result Value Ref Range    Glucose 134 (H) 65 - 99 mg/dL    BUN 11 6 - 20 mg/dL    Creatinine 0.76 0.57 - 1.00 mg/dL    Sodium 137 136 - 145 mmol/L    Potassium 3.6 3.5 - 5.2 mmol/L    Chloride 99 98 - 107 mmol/L    CO2 24.0 22.0 - 29.0 mmol/L    Calcium 9.8 8.6 - 10.5 mg/dL    Total Protein 8.7 (H) 6.0 - 8.5 g/dL    Albumin 4.39 3.50 - 5.20 g/dL    ALT (SGPT) 36 (H) 1 - 33 U/L    AST (SGOT) 34 (H) 1 - 32 U/L    Alkaline Phosphatase 196 (H) 39 - 117 U/L    Total Bilirubin 1.0 0.0 - 1.2 mg/dL    Globulin 4.3 gm/dL    A/G Ratio 1.0 g/dL    BUN/Creatinine Ratio 14.5 7.0 - 25.0    Anion Gap 14.0 5.0 - 15.0 mmol/L    eGFR 95.6 >60.0 mL/min/1.73   Troponin    Specimen: Arm, Left; Blood   Result Value Ref Range    Troponin T <0.010 0.000 - 0.030 ng/mL   Troponin    Specimen: Arm, Right; Blood   Result Value Ref Range    Troponin T <0.010 0.000 - 0.030 ng/mL   Magnesium    Specimen: Arm, Left; Blood   Result Value Ref Range    Magnesium 2.1 1.6 - 2.6 mg/dL   Sedimentation Rate    Specimen: Arm, Left; Blood   Result Value Ref Range    Sed Rate 43 (H) 0 - 20 mm/hr   C-reactive Protein    Specimen: Arm, Left; Blood   Result Value Ref Range    C-Reactive Protein 13.65 (H) 0.00 - 0.50 mg/dL   TSH    Specimen: Arm, Left; Blood   Result Value Ref  Range    TSH 0.506 0.270 - 4.200 uIU/mL   D-dimer, Quantitative    Specimen: Arm, Left; Blood   Result Value Ref Range    D-Dimer, Quantitative 1.78 (C) 0.00 - 0.50 MCGFEU/mL   CBC Auto Differential    Specimen: Arm, Left; Blood   Result Value Ref Range    WBC 11.66 (H) 3.40 - 10.80 10*3/mm3    RBC 5.09 3.77 - 5.28 10*6/mm3    Hemoglobin 14.8 12.0 - 15.9 g/dL    Hematocrit 45.3 34.0 - 46.6 %    MCV 89.0 79.0 - 97.0 fL    MCH 29.1 26.6 - 33.0 pg    MCHC 32.7 31.5 - 35.7 g/dL    RDW 12.8 12.3 - 15.4 %    RDW-SD 41.9 37.0 - 54.0 fl    MPV 10.3 6.0 - 12.0 fL    Platelets 284 140 - 450 10*3/mm3    Neutrophil % 80.7 (H) 42.7 - 76.0 %    Lymphocyte % 11.8 (L) 19.6 - 45.3 %    Monocyte % 6.3 5.0 - 12.0 %    Eosinophil % 0.6 0.3 - 6.2 %    Basophil % 0.2 0.0 - 1.5 %    Immature Grans % 0.4 0.0 - 0.5 %    Neutrophils, Absolute 9.40 (H) 1.70 - 7.00 10*3/mm3    Lymphocytes, Absolute 1.38 0.70 - 3.10 10*3/mm3    Monocytes, Absolute 0.74 0.10 - 0.90 10*3/mm3    Eosinophils, Absolute 0.07 0.00 - 0.40 10*3/mm3    Basophils, Absolute 0.02 0.00 - 0.20 10*3/mm3    Immature Grans, Absolute 0.05 0.00 - 0.05 10*3/mm3    nRBC 0.0 0.0 - 0.2 /100 WBC   Type & Screen    Specimen: Hand, Right; Blood   Result Value Ref Range    ABO Type A     RH type Positive     Antibody Screen Negative     T&S Expiration Date 3/15/2022 11:59:59 PM    ABO RH Specimen Verification    Specimen: Blood   Result Value Ref Range    ABO Type A     RH type Positive    Green Top (Gel)   Result Value Ref Range    Extra Tube Hold for add-ons.    Lavender Top   Result Value Ref Range    Extra Tube hold for add-on    Gold Top - SST   Result Value Ref Range    Extra Tube Hold for add-ons.    Light Blue Top   Result Value Ref Range    Extra Tube hold for add-on      US Gallbladder   Final Result   Findings suggestive of cholecystitis with a distended gallbladder containing sludge and stones. There is wall thickening. Patient was tender over the gallbladder. There is minimal  fluid around the gallbladder      Signer Name: Alexandre Luna MD    Signed: 3/11/2022 11:56 PM    Workstation Name: [facility]     Radiology [facility]      CT Chest Pulmonary Embolism   Final Result   Mild multifocal atelectasis but no focal air space disease and no evidence of pulmonary embolus.      CT findings concerning for acute cholecystitis.      Signer Name: INDY Burton MD    Signed: 3/11/2022 10:33 PM    Workstation Name: [facility]      XR Chest 1 View   Final Result   No acute cardiopulmonary findings.      Signer Name: INDY Burton MD    Signed: 3/11/2022 8:33 PM    Workstation Name: [facility]     Radiology [facility]          ED Course  ED Course as of 03/12/22 0438   Fri Mar 11, 2022   2132 ECG 12 Lead  Vent. rate 95 BPM  GA interval 180 ms  QRS duration 76 ms  QT/QTcB 370/464 ms  P-R-T axes -13 -16 -5  Normal sinus rhythm  Voltage criteria for left ventricular hypertrophy  Abnormal ECG  No previous ECGs available [ES]   2232 XR Chest 1 View  IMPRESSION:  No acute cardiopulmonary findings [MB]   2325 CT Chest Pulmonary Embolism  IMPRESSION:  Mild multifocal atelectasis but no focal air space disease and no evidence of pulmonary embolus.     CT findings concerning for acute cholecystitis. [MB]   Sat Mar 12, 2022   0017 Case d/w Dr. Weir. We reviewed her US gallbladder. She will admit to her service and plan to take to OR tomorrow. [MB]      ED Course User Index  [ES] Francesco Fonseca MD  [MB] Mitali Glass, PATRICIA                                                 [facility]    Final diagnoses:   Acute cholecystitis       ED Disposition  ED Disposition     ED Disposition   Decision to Admit    Condition   --    Comment   Level of Care: Med/Surg [1]   Diagnosis: Acute cholecystitis [575.0.ICD-9-CM]   Admitting Physician: TAM WEIR [[id number]]   Isolate for COVID?: No [0]   Certification: I Certify That Inpatient  Hospital Services Are Medically Necessary For Greater Than 2 Midnights               No follow-up provider specified.       Medication List      No changes were made to your prescriptions during this visit.          Mitali Glass, APRN  03/12/22 0432

## 2022-03-12 NOTE — PLAN OF CARE
Patient arrived on the unit during this shift, complaining of pain and nausea. Patient states it is relived with PRN medications. No s/s of distress noted. Surgical bath completed. Will continue to monitor and follow care plan.

## 2022-03-12 NOTE — PLAN OF CARE
Goal Outcome Evaluation:  Plan of Care Reviewed With: patient        Progress: improving  Outcome Evaluation: Pt currently sitting up in bed eating supper. Denies any complaints at this time. V/S stable with no signs of respiratory distress present. Pt's gallbladder was removed today by Dr. Jones. Pt has done well post-op. Will continue to monitor pt and follow plan of care.

## 2022-03-12 NOTE — OP NOTE
Laparoscopic Cholecystectomy     Surgeon:  Sanaz Jones M.D., LEO    Assistant:  Brett    Indications: This patient presents with symptomatic gallbladder disease and will undergo laparoscopic cholecystectomy.    Pre-operative Diagnosis: Cholelithiasis/cholecystitis    Post-operative Diagnosis: same    Anesthesia: General    Procedure Details   After obtaining informed consent and with venous compression boots in place, patient was taken to the operating room and placed in the supine position. After induction of general anesthesia, antibiotic prophylaxis was administered. General endotracheal anesthesia was then administered and  the abdomen was prepped and draped in the usual sterile fashion.     An incision was made above the umbilicus and the Veress needle was inserted. Pneumoperitoneum was obtained to 15mmHg and the trocars were placed.  The camera was inserted, confirming position within the abdomen.  The patient was placed in reverse Trendelenburg and additional trocars were introduced under direct vision.    The gallbladder was identified, the fundus grasped and retracted cephalad. Adhesions were lysed and with the electrocautery where indicated, taking care not to injure any adjacent organs or viscus. The infundibulum was grasped and retracted laterally, exposing the peritoneum overlying the triangle of Calot. This was then divided and exposed in a blunt fashion. The cystic duct and cystic artery were clearly identified and dissected circumferentially.     The cystic duct was clipped proximally and divided.  The cystic artery was identified, dissected free, ligated with clips and divided as well.     The gallbladder was dissected from the liver bed in retrograde fashion with the electrocautery. The gallbladder was removed. The liver bed was irrigated and inspected. Hemostasis was achieved with the electrocautery.     Camera was switched to the subxiphoid position, the gallbladder was placed within the  Endo Catch and brought out through the umbilical port.  The umbilical fascia and subxiphoid fascia were closed.  The remaining trocars were removed and the skin was closed with a 4-0 Vicryl subcuticular stitch and a sterile dressing was applied.    Instrument, sponge, and needle counts were correct at closure and at the conclusion of the case.     Patient tolerated the procedure well, was taken to the recovery room in stable condition    Findings:  Markedly edematous gallbladder.  Multiple stones impacted within the cystic duct.  Cystic duct was so dilated that that it required transection with the KASSANDRA stapler.    Estimated Blood Loss: Minimal    Blood administered: None           Drains: None    Grafts and Implants: None           Total IV Fluids: Per anesthesia           Specimens: Gallbladder             Complications: None

## 2022-03-12 NOTE — ANESTHESIA PROCEDURE NOTES
Airway  Urgency: elective    Date/Time: 3/12/2022 12:00 PM  Airway not difficult    General Information and Staff    Patient location during procedure: OR  Anesthesiologist: Андрей Muhammad DO  CRNA: Naa Gracia CRNA    Indications and Patient Condition  Indications for airway management: airway protection    Preoxygenated: yes  MILS maintained throughout  Mask difficulty assessment: 1 - vent by mask    Final Airway Details  Final airway type: endotracheal airway      Successful airway: ETT  Cuffed: yes   Successful intubation technique: direct laryngoscopy  Endotracheal tube insertion site: oral  Blade: Sandy  Blade size: 3  ETT size (mm): 7.0  Placement verified by: chest auscultation and capnometry   Cuff volume (mL): 10  Measured from: teeth  ETT/EBT  to teeth (cm): 21  Number of attempts at approach: 1  Assessment: lips, teeth, and gum same as pre-op and atraumatic intubation    Additional Comments  AOI x 1 PASS, +ETCO2, +R/F/C. MOUTH TEETH GUMS SAME AS PREOP; INTUBATED BY dR. Muhammad

## 2022-03-12 NOTE — ANESTHESIA POSTPROCEDURE EVALUATION
Patient: Lizbeth Ramirez    Procedure Summary     Date: 03/12/22 Room / Location: [facility] OR 01 /  [facility] OR    Anesthesia Start: 1155 Anesthesia Stop: 1426    Procedure: CHOLECYSTECTOMY LAPAROSCOPIC POSSIBLE OPEN CHOLECYSTECTOMY (N/A Abdomen) Diagnosis:       Acute cholecystitis      (Acute cholecystitis [K81.0])    Surgeons: Sanaz Jones MD Provider: Андрей Limon DO    Anesthesia Type: general, general with block ASA Status: 2          Anesthesia Type: general, general with block    Vitals  Vitals Value Taken Time   /76 03/12/22 1455   Temp 99 °F (37.2 °C) 03/12/22 1455   Pulse 94 03/12/22 1455   Resp 14 03/12/22 1455   SpO2 94 % 03/12/22 1455           Post Anesthesia Care and Evaluation    Patient location during evaluation: PACU  Patient participation: complete - patient participated  Level of consciousness: awake and alert  Pain score: 1  Pain management: adequate  Airway patency: patent  Anesthetic complications: No anesthetic complications  PONV Status: controlled  Cardiovascular status: acceptable  Respiratory status: acceptable and nasal cannula  Hydration status: euvolemic  No anesthesia care post op

## 2022-03-12 NOTE — PAYOR COMM NOTE
"[facility]  ADAM SNIDER  PHONE  [phone]  FAX  [phone]  NPI:  [id number]    ADMISSION NOTIFICATION    Sam Sparks (51 y.o. Female)             Date of Birth   1970    Social Security Number       Address   [address] [facility] [place] [address]    Home Phone   [phone]    MRN   [id number]       Scientology   None    Marital Status                               Admission Date   3/11/22    Admission Type   Emergency    Admitting Provider   Sanaz Jones MD    Attending Provider   Sanaz Jones MD    Department, Room/Bed   [address], 3315/2S       Discharge Date       Discharge Disposition       Discharge Destination                               Attending Provider: Sanaz Jones MD    Allergies: Erythromycin    Isolation: None   Infection: None   Code Status: CPR   Advance Care Planning Activity    Ht: 160 cm (63\")   Wt: 89.9 kg (198 lb 1.6 oz)    Admission Cmt: None   Principal Problem: None                Active Insurance as of 3/11/2022     Primary Coverage     Payor Plan Insurance Group Employer/Plan Group    [employer] [place]      Payor Plan Address Payor Plan Phone Number Payor Plan Fax Number Effective Dates    [address] [address] [phone]  3/11/2022 - None Entered    [facility] [address]       Subscriber Name Subscriber Birth Date Member ID       SAM SPARKS 1970 [id number]                 Emergency Contacts      (Rel.) Home Phone Work Phone Mobile Phone    WINDY SPARKS (Spouse) [phone] -- --              "

## 2022-03-12 NOTE — ED NOTES
MEDICAL SCREENING:    Reason for Visit: Midsternal CP, denies SOA, cough, fever    Patient initially seen in triage.  The patient was advised further evaluation and diagnostic testing will be needed, some of the treatment and testing will be initiated in the lobby in order to begin the process.  The patient will be returned to the waiting area for the time being and possibly be re-assessed by a subsequent ED provider.  The patient will be brought back to the treatment area in as timely manner as possible.       Willow Lerma, APRN  03/11/22 1954

## 2022-03-13 VITALS
TEMPERATURE: 98.5 F | WEIGHT: 200 LBS | BODY MASS INDEX: 35.44 KG/M2 | RESPIRATION RATE: 18 BRPM | OXYGEN SATURATION: 93 % | HEART RATE: 103 BPM | HEIGHT: 63 IN | SYSTOLIC BLOOD PRESSURE: 146 MMHG | DIASTOLIC BLOOD PRESSURE: 82 MMHG

## 2022-03-13 PROCEDURE — 25010000002 KETOROLAC TROMETHAMINE PER 15 MG: Performed by: SURGERY

## 2022-03-13 PROCEDURE — 99024 POSTOP FOLLOW-UP VISIT: CPT | Performed by: SURGERY

## 2022-03-13 PROCEDURE — 25010000002 PIPERACILLIN SOD-TAZOBACTAM PER 1 G: Performed by: SURGERY

## 2022-03-13 RX ORDER — HYDROCODONE BITARTRATE AND ACETAMINOPHEN 7.5; 325 MG/1; MG/1
1 TABLET ORAL 4 TIMES DAILY PRN
Qty: 12 TABLET | Refills: 0 | Status: SHIPPED | OUTPATIENT
Start: 2022-03-13 | End: 2022-03-15

## 2022-03-13 RX ORDER — IPRATROPIUM BROMIDE AND ALBUTEROL SULFATE 2.5; .5 MG/3ML; MG/3ML
3 SOLUTION RESPIRATORY (INHALATION)
Status: DISCONTINUED | OUTPATIENT
Start: 2022-03-13 | End: 2022-03-13 | Stop reason: HOSPADM

## 2022-03-13 RX ADMIN — KETOROLAC TROMETHAMINE 15 MG: 30 INJECTION, SOLUTION INTRAMUSCULAR; INTRAVENOUS at 04:00

## 2022-03-13 RX ADMIN — SODIUM CHLORIDE 3.38 G: 9 INJECTION, SOLUTION INTRAVENOUS at 04:00

## 2022-03-13 RX ADMIN — ACETAMINOPHEN 1000 MG: 500 TABLET ORAL at 10:22

## 2022-03-13 RX ADMIN — FAMOTIDINE 20 MG: 20 TABLET, FILM COATED ORAL at 10:22

## 2022-03-13 RX ADMIN — POLYETHYLENE GLYCOL (3350) 17 G: 17 POWDER, FOR SOLUTION ORAL at 10:22

## 2022-03-13 RX ADMIN — KETOROLAC TROMETHAMINE 15 MG: 30 INJECTION, SOLUTION INTRAMUSCULAR; INTRAVENOUS at 10:22

## 2022-03-13 RX ADMIN — SODIUM CHLORIDE 3.38 G: 9 INJECTION, SOLUTION INTRAVENOUS at 12:54

## 2022-03-13 RX ADMIN — Medication 10 ML: at 10:22

## 2022-03-13 RX ADMIN — FAMOTIDINE 20 MG: 20 TABLET, FILM COATED ORAL at 17:53

## 2022-03-13 RX ADMIN — ACETAMINOPHEN 1000 MG: 500 TABLET ORAL at 04:00

## 2022-03-13 RX ADMIN — KETOROLAC TROMETHAMINE 15 MG: 30 INJECTION, SOLUTION INTRAMUSCULAR; INTRAVENOUS at 15:01

## 2022-03-13 RX ADMIN — OXYCODONE 5 MG: 5 TABLET ORAL at 19:03

## 2022-03-13 RX ADMIN — FLUOXETINE HYDROCHLORIDE 40 MG: 20 CAPSULE ORAL at 10:22

## 2022-03-13 NOTE — PROGRESS NOTES
LOS: 1 day   Patient Care Team:  Sunshine Monge Known as PCP - General    Post op day # 1, status post laparoscopic cholecystectomy    Subjective     Interval History:  Patient states she is doing well.  She is tolerating a diet without nausea or vomiting.  She is wearing oxygen and according to the nursing staff she did drop her saturation into the upper 80s during the night.  However there is no documentation of this in the vital signs review section of the chart.    History taken from patient.      Objective     Vital Signs  Temp:  [97.4 °F (36.3 °C)-99 °F (37.2 °C)] 97.4 °F (36.3 °C)  Heart Rate:  [80-94] 91  Resp:  [10-24] 18  BP: ()/(54-84) 107/62    Physical Exam:  This is a well-developed well-nourished female in no acute distress  HEENT examination: Sclera are anicteric  Lungs: Scattered rhonchi.  Patient did use the inspirometer with myself and nurse in the room but had poor inspiratory effort with less than 500 cc tidal volume pulled  Abdomen: Hypoactive bowel sounds.  Expected incisional tenderness  Skin/incisions: Incision sites were inspected and demonstrate no drainage or erythema     Results Review:    Results from last 7 days   Lab Units 03/11/22  2252 03/11/22 2034   TROPONIN T ng/mL <0.010 <0.010     Results from last 7 days   Lab Units 03/11/22 2034   CRP mg/dL 13.65*   WBC 10*3/mm3 11.66*   HEMOGLOBIN g/dL 14.8   HEMATOCRIT % 45.3   PLATELETS 10*3/mm3 284         Results from last 7 days   Lab Units 03/11/22 2034   SODIUM mmol/L 137   POTASSIUM mmol/L 3.6   MAGNESIUM mg/dL 2.1   CHLORIDE mmol/L 99   CO2 mmol/L 24.0   BUN mg/dL 11   CREATININE mg/dL 0.76   CALCIUM mg/dL 9.8   GLUCOSE mg/dL 134*   ALBUMIN g/dL 4.39   BILIRUBIN mg/dL 1.0   ALK PHOS U/L 196*   AST (SGOT) U/L 34*   ALT (SGPT) U/L 36*   Estimated Creatinine Clearance: 94.6 mL/min (by C-G formula based on SCr of 0.76 mg/dL).  No results found for: AMMONIA      No results found for: BLOODCX  No results found for: URINECX  No  results found for: WOUNDCX  No results found for: STOOLCX    Imaging:  Imaging Results (Last 24 Hours)     ** No results found for the last 24 hours. **           Impression:  Status post laparoscopic cholecystectomy.  Overall doing well but currently O2 sats are too low for discharge.  This is likely given poor inspiratory effort secondary to postoperative pain as well as underlying COPD/bronchitis    Plan:  We will start patient on breathing treatments and encourage incentive spirometer use.  If she is able to maintain her O2 sats greater than 92% will plan to discharge later today.    Sanaz Jones MD  03/13/22  13:41 EDT      Please note that portions of this note were completed with a voice recognition program.

## 2022-03-13 NOTE — PLAN OF CARE
Goal Outcome Evaluation:           Progress: no change  Outcome Evaluation: Patient has not complained of any pain this shift. Incision sites look clean and dry. VS stable. No distress noted. Titrated O2 down to 1L NC with O2 staying 90-91%. Will continue to monitor and follow plan of care.

## 2022-03-13 NOTE — PLAN OF CARE
Goal Outcome Evaluation:  Plan of Care Reviewed With: patient   Pt resting in bed eager to go home. Pt has rested in bed on RA since around lunch and ambulatory in room. Pt's oxygen saturation has been >92%. Pt shows no s/s of distress. Will continue with plan of care.

## 2022-03-14 ENCOUNTER — TELEPHONE (OUTPATIENT)
Dept: TELEMETRY | Facility: HOSPITAL | Age: 52
End: 2022-03-14

## 2022-03-14 ENCOUNTER — TELEPHONE (OUTPATIENT)
Dept: SURGERY | Facility: CLINIC | Age: 52
End: 2022-03-14

## 2022-03-14 DIAGNOSIS — K81.0 ACUTE CHOLECYSTITIS: Primary | ICD-10-CM

## 2022-03-14 RX ORDER — HYDROCODONE BITARTRATE AND ACETAMINOPHEN 7.5; 325 MG/1; MG/1
1 TABLET ORAL 4 TIMES DAILY PRN
Qty: 10 TABLET | Refills: 0 | Status: SHIPPED | OUTPATIENT
Start: 2022-03-14

## 2022-03-14 RX ORDER — HYDROCODONE BITARTRATE AND ACETAMINOPHEN 7.5; 325 MG/1; MG/1
1 TABLET ORAL 4 TIMES DAILY PRN
Qty: 12 TABLET | Refills: 0 | OUTPATIENT
Start: 2022-03-14

## 2022-03-14 NOTE — PAYOR COMM NOTE
"[facility]  NPI:[id number]    Utilization Review  Contact: Tiffany Whitehead RN  Phone: [phone]  Fax:[phone]    DISCHARGE NOTIFICATION    [id number]    Sam Sparks (51 y.o. Female)             Date of Birth   1970    Social Security Number       Address   [address] [facility] [address]    Home Phone   [phone]    MRN   [id number]       Faith   None    Marital Status                               Admission Date   3/11/22    Admission Type   Emergency    Admitting Provider   Sanaz Jones MD    Attending Provider       Department, Room/Bed   [address], 3315/2S       Discharge Date   3/13/2022    Discharge Disposition   Home or Self Care    Discharge Destination                               Attending Provider: (none)   Allergies: Erythromycin    Isolation: None   Infection: None   Code Status: Prior   Advance Care Planning Activity    Ht: 160 cm (63\")   Wt: 90.7 kg (200 lb)    Admission Cmt: None   Principal Problem: None                Active Insurance as of 3/11/2022     Primary Coverage     Payor Plan Insurance Group Employer/Plan Group    [employer]      Payor Plan Address Payor Plan Phone Number Payor Plan Fax Number Effective Dates    [address] [address] [phone]  3/11/2022 - None Entered    [facility] [address]       Subscriber Name Subscriber Birth Date Member ID       SAM SPARKS 1970 [id number]                 Emergency Contacts      (Rel.) Home Phone Work Phone Mobile Phone    WINDY SPARKS (Spouse) [phone] -- --              "

## 2022-03-14 NOTE — TELEPHONE ENCOUNTER
Spoke with Zayda in Pharmacy, she stated the patient's mother is coming to pharmacy to  pain medicine since wasn't sent to patient's pharmacy it was sent to hospital pharmacy which closes at 5p on the weekends.

## 2022-03-14 NOTE — TELEPHONE ENCOUNTER
Pharmacy Name:   PHARMACY    Pharmacy representative name: SARAH     Pharmacy representative phone number: [phone]    What medication are you calling in regards to:   HYDROcodone-acetaminophen 7.5    What question does the pharmacy have: SARAH FROM  PHARMACY WAS CALLING IN TO SPEAK TO DR. WEIR REGARDING MEDICATION PRESCRIBED.     Who is the provider that prescribed the medication: DR. WEIR

## 2022-03-16 LAB
LAB AP CASE REPORT: NORMAL
PATH REPORT.FINAL DX SPEC: NORMAL

## 2022-03-18 NOTE — DISCHARGE SUMMARY
[facility] GENERAL SURGERY DISCHARGE SUMMARY < 48 HOURS    Patient Identification:  Name:  Lizbeth Ramirez  Age:  51 y.o.  Sex:  female  :  1970  MRN:  [id number]    Date of Admission: 3/11/2022  Date of Discharge:  3/13/2022     ADMISSION DIAGNOSIS:    DISCHARGE DIAGNOSIS:  Same    PROCEDURES PERFORMED:  Procedure(s):  CHOLECYSTECTOMY LAPAROSCOPIC POSSIBLE OPEN CHOLECYSTECTOMY       HOSPITAL COURSE  Patient is a 51 y.o. female admitted to [facility] with cholecystitis.  Please see the admitting history and physical for further details.  Patient was admitted and started on antibiotics.  She underwent laparoscopic cholecystectomy on 3/12/2022.  Patient did well with expected postoperative pain.  She was tolerating a diet and discharged home on 3/13/2022    CONDITION AT DISCHARGE:  Improved    DISCHARGE DISPOSITION   Home    DISCHARGE MEDICATIONS:     Discharge Medications      Continue These Medications      Instructions Start Date   FLUoxetine 40 MG capsule  Commonly known as: PROzac   40 mg, Oral, Daily      mirtazapine 30 MG tablet  Commonly known as: REMERON   30 mg, Oral, Nightly             FOLLOW-UP:  Dr. Jones in 1 week    Activity and diet instructions given to the patient

## 2023-12-19 ENCOUNTER — APPOINTMENT (OUTPATIENT)
Dept: CT IMAGING | Facility: HOSPITAL | Age: 53
End: 2023-12-19
Payer: COMMERCIAL

## 2023-12-19 ENCOUNTER — HOSPITAL ENCOUNTER (EMERGENCY)
Facility: HOSPITAL | Age: 53
Discharge: HOME | End: 2023-12-19
Attending: STUDENT IN AN ORGANIZED HEALTH CARE EDUCATION/TRAINING PROGRAM | Admitting: STUDENT IN AN ORGANIZED HEALTH CARE EDUCATION/TRAINING PROGRAM
Payer: COMMERCIAL

## 2023-12-19 ENCOUNTER — TELEPHONE (OUTPATIENT)
Dept: UROLOGY | Facility: CLINIC | Age: 53
End: 2023-12-19
Payer: COMMERCIAL

## 2023-12-19 ENCOUNTER — OFFICE VISIT (OUTPATIENT)
Dept: UROLOGY | Facility: CLINIC | Age: 53
End: 2023-12-19
Payer: COMMERCIAL

## 2023-12-19 VITALS
DIASTOLIC BLOOD PRESSURE: 76 MMHG | BODY MASS INDEX: 35.44 KG/M2 | SYSTOLIC BLOOD PRESSURE: 158 MMHG | OXYGEN SATURATION: 98 % | TEMPERATURE: 98.5 F | HEART RATE: 96 BPM | HEIGHT: 63 IN | RESPIRATION RATE: 20 BRPM | WEIGHT: 200 LBS

## 2023-12-19 VITALS — BODY MASS INDEX: 35.44 KG/M2 | HEIGHT: 63 IN

## 2023-12-19 DIAGNOSIS — N20.1 RIGHT DISTAL URETERAL CALCULUS: Primary | ICD-10-CM

## 2023-12-19 DIAGNOSIS — N13.2 URETERAL STONE WITH HYDRONEPHROSIS: Primary | ICD-10-CM

## 2023-12-19 LAB
ALBUMIN SERPL-MCNC: 4.1 G/DL (ref 3.5–5.2)
ALBUMIN/GLOB SERPL: 1.4 G/DL
ALP SERPL-CCNC: 163 U/L (ref 39–117)
ALT SERPL W P-5'-P-CCNC: 51 U/L (ref 1–33)
AMPHET+METHAMPHET UR QL: POSITIVE
AMPHETAMINES UR QL: POSITIVE
ANION GAP SERPL CALCULATED.3IONS-SCNC: 12.1 MMOL/L (ref 5–15)
AST SERPL-CCNC: 37 U/L (ref 1–32)
BACTERIA UR QL AUTO: ABNORMAL /HPF
BARBITURATES UR QL SCN: NEGATIVE
BASOPHILS # BLD AUTO: 0.03 10*3/MM3 (ref 0–0.2)
BASOPHILS NFR BLD AUTO: 0.4 % (ref 0–1.5)
BENZODIAZ UR QL SCN: NEGATIVE
BILIRUB SERPL-MCNC: 0.7 MG/DL (ref 0–1.2)
BILIRUB UR QL STRIP: NEGATIVE
BUN SERPL-MCNC: 14 MG/DL (ref 6–20)
BUN/CREAT SERPL: 14.1 (ref 7–25)
BUPRENORPHINE SERPL-MCNC: NEGATIVE NG/ML
CALCIUM SPEC-SCNC: 9.1 MG/DL (ref 8.6–10.5)
CANNABINOIDS SERPL QL: NEGATIVE
CHLORIDE SERPL-SCNC: 102 MMOL/L (ref 98–107)
CLARITY UR: CLEAR
CO2 SERPL-SCNC: 23.9 MMOL/L (ref 22–29)
COCAINE UR QL: NEGATIVE
COLOR UR: YELLOW
CREAT SERPL-MCNC: 0.99 MG/DL (ref 0.57–1)
CRP SERPL-MCNC: 1.03 MG/DL (ref 0–0.5)
DEPRECATED RDW RBC AUTO: 37.1 FL (ref 37–54)
EGFRCR SERPLBLD CKD-EPI 2021: 68.7 ML/MIN/1.73
EOSINOPHIL # BLD AUTO: 0 10*3/MM3 (ref 0–0.4)
EOSINOPHIL NFR BLD AUTO: 0 % (ref 0.3–6.2)
ERYTHROCYTE [DISTWIDTH] IN BLOOD BY AUTOMATED COUNT: 12.3 % (ref 12.3–15.4)
FENTANYL UR-MCNC: POSITIVE NG/ML
GLOBULIN UR ELPH-MCNC: 3 GM/DL
GLUCOSE SERPL-MCNC: 119 MG/DL (ref 65–99)
GLUCOSE UR STRIP-MCNC: NEGATIVE MG/DL
HCT VFR BLD AUTO: 41.7 % (ref 34–46.6)
HGB BLD-MCNC: 14.6 G/DL (ref 12–15.9)
HGB UR QL STRIP.AUTO: ABNORMAL
HOLD SPECIMEN: NORMAL
HOLD SPECIMEN: NORMAL
HYALINE CASTS UR QL AUTO: ABNORMAL /LPF
IMM GRANULOCYTES # BLD AUTO: 0.04 10*3/MM3 (ref 0–0.05)
IMM GRANULOCYTES NFR BLD AUTO: 0.5 % (ref 0–0.5)
KETONES UR QL STRIP: NEGATIVE
LEUKOCYTE ESTERASE UR QL STRIP.AUTO: NEGATIVE
LYMPHOCYTES # BLD AUTO: 1.01 10*3/MM3 (ref 0.7–3.1)
LYMPHOCYTES NFR BLD AUTO: 11.9 % (ref 19.6–45.3)
MCH RBC QN AUTO: 28.9 PG (ref 26.6–33)
MCHC RBC AUTO-ENTMCNC: 35 G/DL (ref 31.5–35.7)
MCV RBC AUTO: 82.6 FL (ref 79–97)
METHADONE UR QL SCN: NEGATIVE
MONOCYTES # BLD AUTO: 0.22 10*3/MM3 (ref 0.1–0.9)
MONOCYTES NFR BLD AUTO: 2.6 % (ref 5–12)
NEUTROPHILS NFR BLD AUTO: 7.17 10*3/MM3 (ref 1.7–7)
NEUTROPHILS NFR BLD AUTO: 84.6 % (ref 42.7–76)
NITRITE UR QL STRIP: NEGATIVE
NRBC BLD AUTO-RTO: 0 /100 WBC (ref 0–0.2)
OPIATES UR QL: POSITIVE
OXYCODONE UR QL SCN: NEGATIVE
PCP UR QL SCN: NEGATIVE
PH UR STRIP.AUTO: 7 [PH] (ref 5–8)
PLATELET # BLD AUTO: 250 10*3/MM3 (ref 140–450)
PMV BLD AUTO: 10.2 FL (ref 6–12)
POTASSIUM SERPL-SCNC: 3.6 MMOL/L (ref 3.5–5.2)
PROT SERPL-MCNC: 7.1 G/DL (ref 6–8.5)
PROT UR QL STRIP: NEGATIVE
RBC # BLD AUTO: 5.05 10*6/MM3 (ref 3.77–5.28)
RBC # UR STRIP: ABNORMAL /HPF
REF LAB TEST METHOD: ABNORMAL
SODIUM SERPL-SCNC: 138 MMOL/L (ref 136–145)
SP GR UR STRIP: 1.01 (ref 1–1.03)
SQUAMOUS #/AREA URNS HPF: ABNORMAL /HPF
TRICYCLICS UR QL SCN: NEGATIVE
UROBILINOGEN UR QL STRIP: ABNORMAL
WBC # UR STRIP: ABNORMAL /HPF
WBC NRBC COR # BLD AUTO: 8.47 10*3/MM3 (ref 3.4–10.8)
WHOLE BLOOD HOLD COAG: NORMAL
WHOLE BLOOD HOLD SPECIMEN: NORMAL

## 2023-12-19 PROCEDURE — 74176 CT ABD & PELVIS W/O CONTRAST: CPT

## 2023-12-19 PROCEDURE — P9612 CATHETERIZE FOR URINE SPEC: HCPCS

## 2023-12-19 PROCEDURE — 25010000002 ONDANSETRON PER 1 MG: Performed by: STUDENT IN AN ORGANIZED HEALTH CARE EDUCATION/TRAINING PROGRAM

## 2023-12-19 PROCEDURE — 25010000002 MORPHINE PER 10 MG: Performed by: STUDENT IN AN ORGANIZED HEALTH CARE EDUCATION/TRAINING PROGRAM

## 2023-12-19 PROCEDURE — 99203 OFFICE O/P NEW LOW 30 MIN: CPT | Performed by: UROLOGY

## 2023-12-19 PROCEDURE — 1159F MED LIST DOCD IN RCRD: CPT | Performed by: UROLOGY

## 2023-12-19 PROCEDURE — 36415 COLL VENOUS BLD VENIPUNCTURE: CPT

## 2023-12-19 PROCEDURE — 25010000002 DIPHENHYDRAMINE PER 50 MG: Performed by: PHYSICIAN ASSISTANT

## 2023-12-19 PROCEDURE — 80307 DRUG TEST PRSMV CHEM ANLYZR: CPT | Performed by: PHYSICIAN ASSISTANT

## 2023-12-19 PROCEDURE — 1160F RVW MEDS BY RX/DR IN RCRD: CPT | Performed by: UROLOGY

## 2023-12-19 PROCEDURE — 86140 C-REACTIVE PROTEIN: CPT | Performed by: PHYSICIAN ASSISTANT

## 2023-12-19 PROCEDURE — 85025 COMPLETE CBC W/AUTO DIFF WBC: CPT | Performed by: PHYSICIAN ASSISTANT

## 2023-12-19 PROCEDURE — 81001 URINALYSIS AUTO W/SCOPE: CPT | Performed by: PHYSICIAN ASSISTANT

## 2023-12-19 PROCEDURE — 99284 EMERGENCY DEPT VISIT MOD MDM: CPT

## 2023-12-19 PROCEDURE — 25010000002 PROCHLORPERAZINE 10 MG/2ML SOLUTION: Performed by: PHYSICIAN ASSISTANT

## 2023-12-19 PROCEDURE — 96375 TX/PRO/DX INJ NEW DRUG ADDON: CPT

## 2023-12-19 PROCEDURE — 25010000002 LORAZEPAM PER 2 MG: Performed by: STUDENT IN AN ORGANIZED HEALTH CARE EDUCATION/TRAINING PROGRAM

## 2023-12-19 PROCEDURE — 96374 THER/PROPH/DIAG INJ IV PUSH: CPT

## 2023-12-19 PROCEDURE — 80053 COMPREHEN METABOLIC PANEL: CPT | Performed by: PHYSICIAN ASSISTANT

## 2023-12-19 RX ORDER — OXYCODONE AND ACETAMINOPHEN 10; 325 MG/1; MG/1
1 TABLET ORAL ONCE
Status: COMPLETED | OUTPATIENT
Start: 2023-12-19 | End: 2023-12-19

## 2023-12-19 RX ORDER — SODIUM CHLORIDE 0.9 % (FLUSH) 0.9 %
10 SYRINGE (ML) INJECTION AS NEEDED
Status: DISCONTINUED | OUTPATIENT
Start: 2023-12-19 | End: 2023-12-19 | Stop reason: HOSPADM

## 2023-12-19 RX ORDER — ONDANSETRON 2 MG/ML
4 INJECTION INTRAMUSCULAR; INTRAVENOUS ONCE
Status: COMPLETED | OUTPATIENT
Start: 2023-12-19 | End: 2023-12-19

## 2023-12-19 RX ORDER — DIPHENHYDRAMINE HYDROCHLORIDE 50 MG/ML
25 INJECTION INTRAMUSCULAR; INTRAVENOUS ONCE
Status: COMPLETED | OUTPATIENT
Start: 2023-12-19 | End: 2023-12-19

## 2023-12-19 RX ORDER — PROCHLORPERAZINE EDISYLATE 5 MG/ML
10 INJECTION INTRAMUSCULAR; INTRAVENOUS ONCE
Status: COMPLETED | OUTPATIENT
Start: 2023-12-19 | End: 2023-12-19

## 2023-12-19 RX ORDER — LORAZEPAM 2 MG/ML
0.5 INJECTION INTRAMUSCULAR ONCE
Status: COMPLETED | OUTPATIENT
Start: 2023-12-19 | End: 2023-12-19

## 2023-12-19 RX ORDER — HYDROXYZINE HYDROCHLORIDE 25 MG/1
25 TABLET, FILM COATED ORAL ONCE
Status: COMPLETED | OUTPATIENT
Start: 2023-12-19 | End: 2023-12-19

## 2023-12-19 RX ADMIN — HYDROXYZINE HYDROCHLORIDE 25 MG: 25 TABLET, FILM COATED ORAL at 06:58

## 2023-12-19 RX ADMIN — DIPHENHYDRAMINE HYDROCHLORIDE 25 MG: 50 INJECTION, SOLUTION INTRAMUSCULAR; INTRAVENOUS at 02:42

## 2023-12-19 RX ADMIN — LORAZEPAM 0.5 MG: 2 INJECTION INTRAMUSCULAR; INTRAVENOUS at 03:22

## 2023-12-19 RX ADMIN — OXYCODONE AND ACETAMINOPHEN 1 TABLET: 10; 325 TABLET ORAL at 06:58

## 2023-12-19 RX ADMIN — PROCHLORPERAZINE EDISYLATE 10 MG: 5 INJECTION INTRAMUSCULAR; INTRAVENOUS at 02:21

## 2023-12-19 RX ADMIN — MORPHINE SULFATE 4 MG: 4 INJECTION, SOLUTION INTRAMUSCULAR; INTRAVENOUS at 01:54

## 2023-12-19 RX ADMIN — ONDANSETRON 4 MG: 2 INJECTION INTRAMUSCULAR; INTRAVENOUS at 01:54

## 2023-12-19 NOTE — ED NOTES
Report received from ZAHIRA Mathis. Pt resting on stretcher A&Ox4, RR even and unlabored, skin WPD. Pt vitals WNL noted to be WNL. Pt denies pain at this time and reports that she is comfortable and ready to be discharged to be able to go to Dr. Crespo's office. Safety measures remain WDL. NADN. WCTM. Safety measures remain WDL.    written material/verbal instruction

## 2023-12-19 NOTE — ED PROVIDER NOTES
Subjective     History provided by:  Patient  Flank Pain  Pain location:  R flank  Pain quality: aching and sharp    Pain radiates to:  Does not radiate  Pain severity:  Moderate  Onset quality:  Sudden  Duration:  6 hours  Timing:  Constant  Progression:  Worsening  Chronicity:  New  Relieved by:  Nothing  Associated symptoms: nausea    Associated symptoms: no chest pain, no dysuria and no fever    Risk factors: obesity        Review of Systems   Constitutional: Negative.  Negative for fever.   HENT: Negative.     Respiratory: Negative.     Cardiovascular: Negative.  Negative for chest pain.   Gastrointestinal:  Positive for nausea. Negative for abdominal pain.   Endocrine: Negative.    Genitourinary:  Positive for difficulty urinating and flank pain. Negative for dysuria.   Skin: Negative.    Neurological: Negative.    Psychiatric/Behavioral: Negative.     All other systems reviewed and are negative.      Past Medical History:   Diagnosis Date    Depression        Allergies   Allergen Reactions    Erythromycin GI Intolerance       Past Surgical History:   Procedure Laterality Date    APPENDECTOMY      CHOLECYSTECTOMY N/A 3/12/2022    Procedure: CHOLECYSTECTOMY LAPAROSCOPIC POSSIBLE OPEN CHOLECYSTECTOMY;  Surgeon: Sanaz Jones MD;  Location: [facility];  Service: General;  Laterality: N/A;       No family history on file.    Social History     Socioeconomic History    Marital status:    Tobacco Use    Smoking status: Every Day     Packs/day: 1.00     Years: 15.00     Additional pack years: 0.00     Total pack years: 15.00     Types: Cigarettes    Smokeless tobacco: Never   Vaping Use    Vaping Use: Never used   Substance and Sexual Activity    Alcohol use: Never    Drug use: Never    Sexual activity: Defer           Objective   Physical Exam  Vitals and nursing note reviewed.   Constitutional:       General: She is not in acute distress.     Appearance: She is well-developed. She is not diaphoretic.    HENT:      Head: Normocephalic and atraumatic.      Right Ear: External ear normal.      Left Ear: External ear normal.      Nose: Nose normal.   Eyes:      Conjunctiva/sclera: Conjunctivae normal.   Neck:      Vascular: No JVD.      Trachea: No tracheal deviation.   Cardiovascular:      Rate and Rhythm: Normal rate.      Heart sounds: No murmur heard.  Pulmonary:      Effort: Pulmonary effort is normal. No respiratory distress.      Breath sounds: No wheezing.   Abdominal:      Palpations: Abdomen is soft.      Tenderness: There is no abdominal tenderness. There is right CVA tenderness.   Musculoskeletal:         General: No deformity. Normal range of motion.      Cervical back: Normal range of motion and neck supple.   Skin:     General: Skin is warm and dry.      Coloration: Skin is not pale.      Findings: No erythema or rash.   Neurological:      Mental Status: She is alert and oriented to person, place, and time.      Cranial Nerves: No cranial nerve deficit.   Psychiatric:         Behavior: Behavior normal.         Thought Content: Thought content normal.         Procedures           ED Course  ED Course as of 12/19/23 0725   Tue Dec 19, 2023   0303 CT abd pelvis rad interpreted: 1. Mild to moderate right hydronephrosis and hydroureter due to a 6 mm  stone at the right ureterovesicular junction.  2. Bilateral nephrolithiasis.  3. No bowel obstruction or perforation.   [RB]   0322 Patient noted to have a 6 mm right ureteral stone.  Patient be boarded in the emergency department until urology coverage can be obtained around 7 AM. [RB]   0646 Discussed patient with on-call urology Dr. Crespo.  Dr. Crespo is agreeable to see patient in his office this morning.  Patient will be discharged from the emergency department with instructions to go to his office.  Patient will be scheduled for intervention for the kidney stone more than likely to take place tomorrow. [RB]      ED Course User Index  [RB] Herminio Ritter  BRENDON ALEXANDER                                             Medical Decision Making  53-year-old female with acute onset of right flank pain.    Problems Addressed:  Ureteral stone with hydronephrosis: acute illness or injury     Details: Workup was positive for 6 mm ureteral stone.  Patient be discharged to the office of Dr. Crespo for further intervention.  More than likely go to surgery within 24 hours.  Renal function is normal and patient is able to urinate.  Feel the pain has been adequately controlled.    Amount and/or Complexity of Data Reviewed  Labs: ordered.  Radiology: ordered. Decision-making details documented in ED Course.    Risk  Prescription drug management.        Final diagnoses:   Ureteral stone with hydronephrosis       ED Disposition  ED Disposition       ED Disposition   Discharge    Condition   Stable    Comment   --               Prosper Crespo MD  [address] [facility] [address]  [place] [id number]  [phone]    Go today  Go to his office after discharge from ER         Medication List      No changes were made to your prescriptions during this visit.            Herminio Ritter II, PA  12/19/23 0783

## 2023-12-19 NOTE — H&P (VIEW-ONLY)
Chief Complaint:      Chief Complaint   Patient presents with    Nephrolithiasis     New patient / Er follow up        HPI:   53 y.o. female referred from the emergency room she has had multiple stones never had surgery she has bilateral renal stones and a distal right 5 mm obstructing stone with hydronephrosis.  I will set her up ureteroscopy tomorrow and stone extraction.    Past Medical History:     Past Medical History:   Diagnosis Date    Depression        Current Meds:     Current Outpatient Medications   Medication Sig Dispense Refill    FLUoxetine (PROzac) 40 MG capsule Take 1 capsule by mouth Daily.      HYDROcodone-acetaminophen (Norco) 7.5-325 MG per tablet Take 1 tablet by mouth 4 (Four) Times a Day As Needed for Moderate Pain . 10 tablet 0    mirtazapine (REMERON) 30 MG tablet Take 1 tablet by mouth Every Night.       No current facility-administered medications for this visit.        Allergies:      Allergies   Allergen Reactions    Erythromycin GI Intolerance        Past Surgical History:     Past Surgical History:   Procedure Laterality Date    APPENDECTOMY      CHOLECYSTECTOMY N/A 3/12/2022    Procedure: CHOLECYSTECTOMY LAPAROSCOPIC POSSIBLE OPEN CHOLECYSTECTOMY;  Surgeon: Sanaz Jones MD;  Location: [facility];  Service: General;  Laterality: N/A;       Social History:     Social History     Socioeconomic History    Marital status:    Tobacco Use    Smoking status: Every Day     Packs/day: 1.00     Years: 15.00     Additional pack years: 0.00     Total pack years: 15.00     Types: Cigarettes    Smokeless tobacco: Never   Vaping Use    Vaping Use: Never used   Substance and Sexual Activity    Alcohol use: Never    Drug use: Never    Sexual activity: Defer       Family History:     No family history on file.    Review of Systems:     Review of Systems   Constitutional: Negative.  Negative for activity change, appetite change, chills, diaphoresis, fatigue and unexpected weight change.    HENT:  Negative for congestion, dental problem, drooling, ear discharge, ear pain, facial swelling, hearing loss, mouth sores, nosebleeds, postnasal drip, rhinorrhea, sinus pressure, sneezing, sore throat, tinnitus, trouble swallowing and voice change.    Eyes: Negative.  Negative for photophobia, pain, discharge, redness, itching and visual disturbance.   Respiratory: Negative.  Negative for apnea, cough, choking, chest tightness, shortness of breath, wheezing and stridor.    Cardiovascular: Negative.  Negative for chest pain, palpitations and leg swelling.   Gastrointestinal: Negative.  Negative for abdominal distention, abdominal pain, anal bleeding, blood in stool, constipation, diarrhea, nausea, rectal pain and vomiting.   Endocrine: Negative.  Negative for cold intolerance, heat intolerance, polydipsia, polyphagia and polyuria.   Genitourinary:  Positive for difficulty urinating, flank pain and frequency.   Musculoskeletal:  Negative for arthralgias, back pain, gait problem, joint swelling, myalgias, neck pain and neck stiffness.   Skin: Negative.  Negative for color change, pallor, rash and wound.   Allergic/Immunologic: Negative.  Negative for environmental allergies, food allergies and immunocompromised state.   Neurological: Negative.  Negative for dizziness, tremors, seizures, syncope, facial asymmetry, speech difficulty, weakness, light-headedness, numbness and headaches.   Hematological: Negative.  Negative for adenopathy. Does not bruise/bleed easily.   Psychiatric/Behavioral:  Negative for agitation, behavioral problems, confusion, decreased concentration, dysphoric mood, hallucinations, self-injury, sleep disturbance and suicidal ideas. The patient is not nervous/anxious and is not hyperactive.    All other systems reviewed and are negative.      Physical Exam:     Physical Exam  Constitutional:       Appearance: She is well-developed.   HENT:      Head: Normocephalic and atraumatic.      Right  Ear: External ear normal.      Left Ear: External ear normal.   Eyes:      Conjunctiva/sclera: Conjunctivae normal.      Pupils: Pupils are equal, round, and reactive to light.   Cardiovascular:      Rate and Rhythm: Normal rate and regular rhythm.      Heart sounds: Normal heart sounds.   Pulmonary:      Effort: Pulmonary effort is normal.      Breath sounds: Normal breath sounds.   Abdominal:      General: Bowel sounds are normal. There is no distension.      Palpations: Abdomen is soft. There is no mass.      Tenderness: There is no abdominal tenderness. There is no guarding or rebound.   Genitourinary:     Vagina: No vaginal discharge.      Comments: Very somnolent  Musculoskeletal:         General: Normal range of motion.   Skin:     General: Skin is warm and dry.   Neurological:      Mental Status: She is alert.      Deep Tendon Reflexes: Reflexes are normal and symmetric.   Psychiatric:         Behavior: Behavior normal.         Thought Content: Thought content normal.         Judgment: Judgment normal.         I have reviewed the following portions of the patient's history: Allergies, current medications, past family history, past medical history, past social history, past surgical history, problem list, and ROS and confirm it is accurate.    Recent Image (CT and/or KUB):      CT Abdomen and Pelvis: No results found for this or any previous visit.       CT Stone Protocol: Results for orders placed during the hospital encounter of 12/19/23    CT Abdomen Pelvis Stone Protocol    Narrative  EXAMINATION: CT abdomen and pelvis without contrast    HISTORY: Right flank pain.    COMPARISON: None.    TECHNIQUE: Contiguous 4 mm thick slices were obtained through the  abdomen and pelvis without contrast. Coronal and sagittal reformats were  performed.    FINDINGS:    ABDOMEN:  No focal airspace disease is appreciated within the visualized lung  bases.    The lack of intravenous contrast material limits evaluation of  the solid  abdominal viscera. However, no contour deforming lesion is appreciated  within the unenhanced liver, spleen, adrenal glands or pancreas. The  gallbladder is surgically absent. There is contour regularity within the  left kidney likely related to scarring. Bilateral renal calculi are  noted. Mild to moderate right hydronephrosis and hydroureter are noted  due to a 6 mm stone at the right ureterovesicular junction. No  adenopathy, free or loculated collection is appreciated within the upper  abdomen. A fat-containing periumbilical hernia is noted. No herniation  of bowel.    PELVIS:  There has been prior appendectomy. No bowel obstruction or perforation.  No gross bowel wall thickening. No free air. No significant free or  loculated fluid collection is identified.    Impression  1. Mild to moderate right hydronephrosis and hydroureter due to a 6 mm  stone at the right ureterovesicular junction.  2. Bilateral nephrolithiasis.  3. No bowel obstruction or perforation.    This report was finalized on 12/19/2023 2:59 AM by Darryl Pollard MD.       KUB: No results found for this or any previous visit.       Labs (past 3 months):      Admission on 12/19/2023, Discharged on 12/19/2023   Component Date Value Ref Range Status    Glucose 12/19/2023 119 (H)  65 - 99 mg/dL Final    BUN 12/19/2023 14  6 - 20 mg/dL Final    Creatinine 12/19/2023 0.99  0.57 - 1.00 mg/dL Final    Sodium 12/19/2023 138  136 - 145 mmol/L Final    Potassium 12/19/2023 3.6  3.5 - 5.2 mmol/L Final    Chloride 12/19/2023 102  98 - 107 mmol/L Final    CO2 12/19/2023 23.9  22.0 - 29.0 mmol/L Final    Calcium 12/19/2023 9.1  8.6 - 10.5 mg/dL Final    Total Protein 12/19/2023 7.1  6.0 - 8.5 g/dL Final    Albumin 12/19/2023 4.1  3.5 - 5.2 g/dL Final    ALT (SGPT) 12/19/2023 51 (H)  1 - 33 U/L Final    AST (SGOT) 12/19/2023 37 (H)  1 - 32 U/L Final    Alkaline Phosphatase 12/19/2023 163 (H)  39 - 117 U/L Final    Total Bilirubin 12/19/2023 0.7  0.0 -  1.2 mg/dL Final    Globulin 12/19/2023 3.0  gm/dL Final    A/G Ratio 12/19/2023 1.4  g/dL Final    BUN/Creatinine Ratio 12/19/2023 14.1  7.0 - 25.0 Final    Anion Gap 12/19/2023 12.1  5.0 - 15.0 mmol/L Final    eGFR 12/19/2023 68.7  >60.0 mL/min/1.73 Final    Color, UA 12/19/2023 Yellow  Yellow, Straw Final    Appearance, UA 12/19/2023 Clear  Clear Final    pH, UA 12/19/2023 7.0  5.0 - 8.0 Final    Specific Gravity, UA 12/19/2023 1.007  1.005 - 1.030 Final    Glucose, UA 12/19/2023 Negative  Negative Final    Ketones, UA 12/19/2023 Negative  Negative Final    Bilirubin, UA 12/19/2023 Negative  Negative Final    Blood, UA 12/19/2023 Large (3+) (A)  Negative Final    Protein, UA 12/19/2023 Negative  Negative Final    Leuk Esterase, UA 12/19/2023 Negative  Negative Final    Nitrite, UA 12/19/2023 Negative  Negative Final    Urobilinogen, UA 12/19/2023 0.2 E.U./dL  0.2 - 1.0 E.U./dL Final    C-Reactive Protein 12/19/2023 1.03 (H)  0.00 - 0.50 mg/dL Final    WBC 12/19/2023 8.47  3.40 - 10.80 10*3/mm3 Final    RBC 12/19/2023 5.05  3.77 - 5.28 10*6/mm3 Final    Hemoglobin 12/19/2023 14.6  12.0 - 15.9 g/dL Final    Hematocrit 12/19/2023 41.7  34.0 - 46.6 % Final    MCV 12/19/2023 82.6  79.0 - 97.0 fL Final    MCH 12/19/2023 28.9  26.6 - 33.0 pg Final    MCHC 12/19/2023 35.0  31.5 - 35.7 g/dL Final    RDW 12/19/2023 12.3  12.3 - 15.4 % Final    RDW-SD 12/19/2023 37.1  37.0 - 54.0 fl Final    MPV 12/19/2023 10.2  6.0 - 12.0 fL Final    Platelets 12/19/2023 250  140 - 450 10*3/mm3 Final    Neutrophil % 12/19/2023 84.6 (H)  42.7 - 76.0 % Final    Lymphocyte % 12/19/2023 11.9 (L)  19.6 - 45.3 % Final    Monocyte % 12/19/2023 2.6 (L)  5.0 - 12.0 % Final    Eosinophil % 12/19/2023 0.0 (L)  0.3 - 6.2 % Final    Basophil % 12/19/2023 0.4  0.0 - 1.5 % Final    Immature Grans % 12/19/2023 0.5  0.0 - 0.5 % Final    Neutrophils, Absolute 12/19/2023 7.17 (H)  1.70 - 7.00 10*3/mm3 Final    Lymphocytes, Absolute 12/19/2023 1.01  0.70 -  3.10 10*3/mm3 Final    Monocytes, Absolute 12/19/2023 0.22  0.10 - 0.90 10*3/mm3 Final    Eosinophils, Absolute 12/19/2023 0.00  0.00 - 0.40 10*3/mm3 Final    Basophils, Absolute 12/19/2023 0.03  0.00 - 0.20 10*3/mm3 Final    Immature Grans, Absolute 12/19/2023 0.04  0.00 - 0.05 10*3/mm3 Final    nRBC 12/19/2023 0.0  0.0 - 0.2 /100 WBC Final    Extra Tube 12/19/2023 Hold for add-ons.   Final    Auto resulted.    Extra Tube 12/19/2023 hold for add-on   Final    Auto resulted    Extra Tube 12/19/2023 Hold for add-ons.   Final    Auto resulted.    Extra Tube 12/19/2023 Hold for add-ons.   Final    Auto resulted    RBC, UA 12/19/2023 Too Numerous to Count (A)  None Seen, 0-2 /HPF Final    WBC, UA 12/19/2023 0-2  None Seen, 0-2 /HPF Final    Urine culture not indicated.    Bacteria, UA 12/19/2023 None Seen  None Seen /HPF Final    Squamous Epithelial Cells, UA 12/19/2023 None Seen  None Seen, 0-2 /HPF Final    Hyaline Casts, UA 12/19/2023 None Seen  None Seen /LPF Final    Methodology 12/19/2023 Automated Microscopy   Final    THC, Screen, Urine 12/19/2023 Negative  Negative Final    Phencyclidine (PCP), Urine 12/19/2023 Negative  Negative Final    Cocaine Screen, Urine 12/19/2023 Negative  Negative Final    Methamphetamine, Ur 12/19/2023 Positive (A)  Negative Final    Opiate Screen 12/19/2023 Positive (A)  Negative Final    Amphetamine Screen, Urine 12/19/2023 Positive (A)  Negative Final    Benzodiazepine Screen, Urine 12/19/2023 Negative  Negative Final    Tricyclic Antidepressants Screen 12/19/2023 Negative  Negative Final    Methadone Screen, Urine 12/19/2023 Negative  Negative Final    Barbiturates Screen, Urine 12/19/2023 Negative  Negative Final    Oxycodone Screen, Urine 12/19/2023 Negative  Negative Final    Buprenorphine, Screen, Urine 12/19/2023 Negative  Negative Final    Fentanyl, Urine 12/19/2023 Positive (A)  Negative Final        Procedure:       Assessment/Plan:   Ureteral calculus-patient has been  diagnosed with a ureteral calculus.  We have discussed the various parameters regarding spontaneous passage including the notion that a 2 mm stone has a high likelihood of spontaneous passage versus a larger stone being caught up in the upper areas of the urinary tract.  We also discussed the medical management of stone disease and the use of medical expulsive therapy in the form of Flomax.  This is used in an off label setting.  We discussed the indicators for intervention including  absolute indicators such as sepsis and uncontrollable severe pain, as well as the relative indicators of moderate pain that is well-controlled with various analgesia.  I also talked about nonoperative management including ambulation and increasing fluids and hot tub as being an effective adjuncts in the treatment of a ureteral stone.  For surgery in the morning          This document has been electronically signed by CONSUELO MANRIQUE MD December 19, 2023 08:10 EST    Dictated Utilizing Dragon Dictation: Part of this note may be an electronic transcription/translation of spoken language to printed text using the Dragon Dictation System.

## 2023-12-19 NOTE — PROGRESS NOTES
Chief Complaint:      Chief Complaint   Patient presents with    Nephrolithiasis     New patient / Er follow up        HPI:   53 y.o. female referred from the emergency room she has had multiple stones never had surgery she has bilateral renal stones and a distal right 5 mm obstructing stone with hydronephrosis.  I will set her up ureteroscopy tomorrow and stone extraction.    Past Medical History:     Past Medical History:   Diagnosis Date    Depression        Current Meds:     Current Outpatient Medications   Medication Sig Dispense Refill    FLUoxetine (PROzac) 40 MG capsule Take 1 capsule by mouth Daily.      HYDROcodone-acetaminophen (Norco) 7.5-325 MG per tablet Take 1 tablet by mouth 4 (Four) Times a Day As Needed for Moderate Pain . 10 tablet 0    mirtazapine (REMERON) 30 MG tablet Take 1 tablet by mouth Every Night.       No current facility-administered medications for this visit.        Allergies:      Allergies   Allergen Reactions    Erythromycin GI Intolerance        Past Surgical History:     Past Surgical History:   Procedure Laterality Date    APPENDECTOMY      CHOLECYSTECTOMY N/A 3/12/2022    Procedure: CHOLECYSTECTOMY LAPAROSCOPIC POSSIBLE OPEN CHOLECYSTECTOMY;  Surgeon: Sanaz Jones MD;  Location: [facility];  Service: General;  Laterality: N/A;       Social History:     Social History     Socioeconomic History    Marital status:    Tobacco Use    Smoking status: Every Day     Packs/day: 1.00     Years: 15.00     Additional pack years: 0.00     Total pack years: 15.00     Types: Cigarettes    Smokeless tobacco: Never   Vaping Use    Vaping Use: Never used   Substance and Sexual Activity    Alcohol use: Never    Drug use: Never    Sexual activity: Defer       Family History:     No family history on file.    Review of Systems:     Review of Systems   Constitutional: Negative.  Negative for activity change, appetite change, chills, diaphoresis, fatigue and unexpected weight change.    HENT:  Negative for congestion, dental problem, drooling, ear discharge, ear pain, facial swelling, hearing loss, mouth sores, nosebleeds, postnasal drip, rhinorrhea, sinus pressure, sneezing, sore throat, tinnitus, trouble swallowing and voice change.    Eyes: Negative.  Negative for photophobia, pain, discharge, redness, itching and visual disturbance.   Respiratory: Negative.  Negative for apnea, cough, choking, chest tightness, shortness of breath, wheezing and stridor.    Cardiovascular: Negative.  Negative for chest pain, palpitations and leg swelling.   Gastrointestinal: Negative.  Negative for abdominal distention, abdominal pain, anal bleeding, blood in stool, constipation, diarrhea, nausea, rectal pain and vomiting.   Endocrine: Negative.  Negative for cold intolerance, heat intolerance, polydipsia, polyphagia and polyuria.   Genitourinary:  Positive for difficulty urinating, flank pain and frequency.   Musculoskeletal:  Negative for arthralgias, back pain, gait problem, joint swelling, myalgias, neck pain and neck stiffness.   Skin: Negative.  Negative for color change, pallor, rash and wound.   Allergic/Immunologic: Negative.  Negative for environmental allergies, food allergies and immunocompromised state.   Neurological: Negative.  Negative for dizziness, tremors, seizures, syncope, facial asymmetry, speech difficulty, weakness, light-headedness, numbness and headaches.   Hematological: Negative.  Negative for adenopathy. Does not bruise/bleed easily.   Psychiatric/Behavioral:  Negative for agitation, behavioral problems, confusion, decreased concentration, dysphoric mood, hallucinations, self-injury, sleep disturbance and suicidal ideas. The patient is not nervous/anxious and is not hyperactive.    All other systems reviewed and are negative.      Physical Exam:     Physical Exam  Constitutional:       Appearance: She is well-developed.   HENT:      Head: Normocephalic and atraumatic.      Right  Ear: External ear normal.      Left Ear: External ear normal.   Eyes:      Conjunctiva/sclera: Conjunctivae normal.      Pupils: Pupils are equal, round, and reactive to light.   Cardiovascular:      Rate and Rhythm: Normal rate and regular rhythm.      Heart sounds: Normal heart sounds.   Pulmonary:      Effort: Pulmonary effort is normal.      Breath sounds: Normal breath sounds.   Abdominal:      General: Bowel sounds are normal. There is no distension.      Palpations: Abdomen is soft. There is no mass.      Tenderness: There is no abdominal tenderness. There is no guarding or rebound.   Genitourinary:     Vagina: No vaginal discharge.      Comments: Very somnolent  Musculoskeletal:         General: Normal range of motion.   Skin:     General: Skin is warm and dry.   Neurological:      Mental Status: She is alert.      Deep Tendon Reflexes: Reflexes are normal and symmetric.   Psychiatric:         Behavior: Behavior normal.         Thought Content: Thought content normal.         Judgment: Judgment normal.         I have reviewed the following portions of the patient's history: Allergies, current medications, past family history, past medical history, past social history, past surgical history, problem list, and ROS and confirm it is accurate.    Recent Image (CT and/or KUB):      CT Abdomen and Pelvis: No results found for this or any previous visit.       CT Stone Protocol: Results for orders placed during the hospital encounter of 12/19/23    CT Abdomen Pelvis Stone Protocol    Narrative  EXAMINATION: CT abdomen and pelvis without contrast    HISTORY: Right flank pain.    COMPARISON: None.    TECHNIQUE: Contiguous 4 mm thick slices were obtained through the  abdomen and pelvis without contrast. Coronal and sagittal reformats were  performed.    FINDINGS:    ABDOMEN:  No focal airspace disease is appreciated within the visualized lung  bases.    The lack of intravenous contrast material limits evaluation of  the solid  abdominal viscera. However, no contour deforming lesion is appreciated  within the unenhanced liver, spleen, adrenal glands or pancreas. The  gallbladder is surgically absent. There is contour regularity within the  left kidney likely related to scarring. Bilateral renal calculi are  noted. Mild to moderate right hydronephrosis and hydroureter are noted  due to a 6 mm stone at the right ureterovesicular junction. No  adenopathy, free or loculated collection is appreciated within the upper  abdomen. A fat-containing periumbilical hernia is noted. No herniation  of bowel.    PELVIS:  There has been prior appendectomy. No bowel obstruction or perforation.  No gross bowel wall thickening. No free air. No significant free or  loculated fluid collection is identified.    Impression  1. Mild to moderate right hydronephrosis and hydroureter due to a 6 mm  stone at the right ureterovesicular junction.  2. Bilateral nephrolithiasis.  3. No bowel obstruction or perforation.    This report was finalized on 12/19/2023 2:59 AM by Darryl Pollard MD.       KUB: No results found for this or any previous visit.       Labs (past 3 months):      Admission on 12/19/2023, Discharged on 12/19/2023   Component Date Value Ref Range Status    Glucose 12/19/2023 119 (H)  65 - 99 mg/dL Final    BUN 12/19/2023 14  6 - 20 mg/dL Final    Creatinine 12/19/2023 0.99  0.57 - 1.00 mg/dL Final    Sodium 12/19/2023 138  136 - 145 mmol/L Final    Potassium 12/19/2023 3.6  3.5 - 5.2 mmol/L Final    Chloride 12/19/2023 102  98 - 107 mmol/L Final    CO2 12/19/2023 23.9  22.0 - 29.0 mmol/L Final    Calcium 12/19/2023 9.1  8.6 - 10.5 mg/dL Final    Total Protein 12/19/2023 7.1  6.0 - 8.5 g/dL Final    Albumin 12/19/2023 4.1  3.5 - 5.2 g/dL Final    ALT (SGPT) 12/19/2023 51 (H)  1 - 33 U/L Final    AST (SGOT) 12/19/2023 37 (H)  1 - 32 U/L Final    Alkaline Phosphatase 12/19/2023 163 (H)  39 - 117 U/L Final    Total Bilirubin 12/19/2023 0.7  0.0 -  1.2 mg/dL Final    Globulin 12/19/2023 3.0  gm/dL Final    A/G Ratio 12/19/2023 1.4  g/dL Final    BUN/Creatinine Ratio 12/19/2023 14.1  7.0 - 25.0 Final    Anion Gap 12/19/2023 12.1  5.0 - 15.0 mmol/L Final    eGFR 12/19/2023 68.7  >60.0 mL/min/1.73 Final    Color, UA 12/19/2023 Yellow  Yellow, Straw Final    Appearance, UA 12/19/2023 Clear  Clear Final    pH, UA 12/19/2023 7.0  5.0 - 8.0 Final    Specific Gravity, UA 12/19/2023 1.007  1.005 - 1.030 Final    Glucose, UA 12/19/2023 Negative  Negative Final    Ketones, UA 12/19/2023 Negative  Negative Final    Bilirubin, UA 12/19/2023 Negative  Negative Final    Blood, UA 12/19/2023 Large (3+) (A)  Negative Final    Protein, UA 12/19/2023 Negative  Negative Final    Leuk Esterase, UA 12/19/2023 Negative  Negative Final    Nitrite, UA 12/19/2023 Negative  Negative Final    Urobilinogen, UA 12/19/2023 0.2 E.U./dL  0.2 - 1.0 E.U./dL Final    C-Reactive Protein 12/19/2023 1.03 (H)  0.00 - 0.50 mg/dL Final    WBC 12/19/2023 8.47  3.40 - 10.80 10*3/mm3 Final    RBC 12/19/2023 5.05  3.77 - 5.28 10*6/mm3 Final    Hemoglobin 12/19/2023 14.6  12.0 - 15.9 g/dL Final    Hematocrit 12/19/2023 41.7  34.0 - 46.6 % Final    MCV 12/19/2023 82.6  79.0 - 97.0 fL Final    MCH 12/19/2023 28.9  26.6 - 33.0 pg Final    MCHC 12/19/2023 35.0  31.5 - 35.7 g/dL Final    RDW 12/19/2023 12.3  12.3 - 15.4 % Final    RDW-SD 12/19/2023 37.1  37.0 - 54.0 fl Final    MPV 12/19/2023 10.2  6.0 - 12.0 fL Final    Platelets 12/19/2023 250  140 - 450 10*3/mm3 Final    Neutrophil % 12/19/2023 84.6 (H)  42.7 - 76.0 % Final    Lymphocyte % 12/19/2023 11.9 (L)  19.6 - 45.3 % Final    Monocyte % 12/19/2023 2.6 (L)  5.0 - 12.0 % Final    Eosinophil % 12/19/2023 0.0 (L)  0.3 - 6.2 % Final    Basophil % 12/19/2023 0.4  0.0 - 1.5 % Final    Immature Grans % 12/19/2023 0.5  0.0 - 0.5 % Final    Neutrophils, Absolute 12/19/2023 7.17 (H)  1.70 - 7.00 10*3/mm3 Final    Lymphocytes, Absolute 12/19/2023 1.01  0.70 -  3.10 10*3/mm3 Final    Monocytes, Absolute 12/19/2023 0.22  0.10 - 0.90 10*3/mm3 Final    Eosinophils, Absolute 12/19/2023 0.00  0.00 - 0.40 10*3/mm3 Final    Basophils, Absolute 12/19/2023 0.03  0.00 - 0.20 10*3/mm3 Final    Immature Grans, Absolute 12/19/2023 0.04  0.00 - 0.05 10*3/mm3 Final    nRBC 12/19/2023 0.0  0.0 - 0.2 /100 WBC Final    Extra Tube 12/19/2023 Hold for add-ons.   Final    Auto resulted.    Extra Tube 12/19/2023 hold for add-on   Final    Auto resulted    Extra Tube 12/19/2023 Hold for add-ons.   Final    Auto resulted.    Extra Tube 12/19/2023 Hold for add-ons.   Final    Auto resulted    RBC, UA 12/19/2023 Too Numerous to Count (A)  None Seen, 0-2 /HPF Final    WBC, UA 12/19/2023 0-2  None Seen, 0-2 /HPF Final    Urine culture not indicated.    Bacteria, UA 12/19/2023 None Seen  None Seen /HPF Final    Squamous Epithelial Cells, UA 12/19/2023 None Seen  None Seen, 0-2 /HPF Final    Hyaline Casts, UA 12/19/2023 None Seen  None Seen /LPF Final    Methodology 12/19/2023 Automated Microscopy   Final    THC, Screen, Urine 12/19/2023 Negative  Negative Final    Phencyclidine (PCP), Urine 12/19/2023 Negative  Negative Final    Cocaine Screen, Urine 12/19/2023 Negative  Negative Final    Methamphetamine, Ur 12/19/2023 Positive (A)  Negative Final    Opiate Screen 12/19/2023 Positive (A)  Negative Final    Amphetamine Screen, Urine 12/19/2023 Positive (A)  Negative Final    Benzodiazepine Screen, Urine 12/19/2023 Negative  Negative Final    Tricyclic Antidepressants Screen 12/19/2023 Negative  Negative Final    Methadone Screen, Urine 12/19/2023 Negative  Negative Final    Barbiturates Screen, Urine 12/19/2023 Negative  Negative Final    Oxycodone Screen, Urine 12/19/2023 Negative  Negative Final    Buprenorphine, Screen, Urine 12/19/2023 Negative  Negative Final    Fentanyl, Urine 12/19/2023 Positive (A)  Negative Final        Procedure:       Assessment/Plan:   Ureteral calculus-patient has been  diagnosed with a ureteral calculus.  We have discussed the various parameters regarding spontaneous passage including the notion that a 2 mm stone has a high likelihood of spontaneous passage versus a larger stone being caught up in the upper areas of the urinary tract.  We also discussed the medical management of stone disease and the use of medical expulsive therapy in the form of Flomax.  This is used in an off label setting.  We discussed the indicators for intervention including  absolute indicators such as sepsis and uncontrollable severe pain, as well as the relative indicators of moderate pain that is well-controlled with various analgesia.  I also talked about nonoperative management including ambulation and increasing fluids and hot tub as being an effective adjuncts in the treatment of a ureteral stone.  For surgery in the morning          This document has been electronically signed by CONSUELO MANRIQUE MD December 19, 2023 08:10 EST    Dictated Utilizing Dragon Dictation: Part of this note may be an electronic transcription/translation of spoken language to printed text using the Dragon Dictation System.

## 2023-12-19 NOTE — TELEPHONE ENCOUNTER
The pt's mother Del called and asked for doc to send her in some pain meds to do her in case she woke up tonight in pain. We spoke to Zak and based on her drug screen she was positive for fentanyl and methamphetamine and we could not write anything. The mother expressed understanding.

## 2023-12-20 ENCOUNTER — ANESTHESIA EVENT (OUTPATIENT)
Dept: PERIOP | Facility: HOSPITAL | Age: 53
End: 2023-12-20
Payer: COMMERCIAL

## 2023-12-20 ENCOUNTER — APPOINTMENT (OUTPATIENT)
Dept: GENERAL RADIOLOGY | Facility: HOSPITAL | Age: 53
End: 2023-12-20
Payer: COMMERCIAL

## 2023-12-20 ENCOUNTER — ANESTHESIA (OUTPATIENT)
Dept: PERIOP | Facility: HOSPITAL | Age: 53
End: 2023-12-20
Payer: COMMERCIAL

## 2023-12-20 ENCOUNTER — HOSPITAL ENCOUNTER (OUTPATIENT)
Facility: HOSPITAL | Age: 53
Discharge: HOME | End: 2023-12-20
Attending: UROLOGY | Admitting: UROLOGY
Payer: COMMERCIAL

## 2023-12-20 VITALS
BODY MASS INDEX: 33.24 KG/M2 | HEIGHT: 63 IN | HEART RATE: 82 BPM | OXYGEN SATURATION: 97 % | TEMPERATURE: 98 F | WEIGHT: 187.6 LBS | SYSTOLIC BLOOD PRESSURE: 132 MMHG | DIASTOLIC BLOOD PRESSURE: 84 MMHG | RESPIRATION RATE: 16 BRPM

## 2023-12-20 DIAGNOSIS — N20.1 RIGHT DISTAL URETERAL CALCULUS: ICD-10-CM

## 2023-12-20 PROCEDURE — 25810000003 LACTATED RINGERS PER 1000 ML: Performed by: NURSE ANESTHETIST, CERTIFIED REGISTERED

## 2023-12-20 PROCEDURE — 25010000002 FENTANYL CITRATE (PF) 50 MCG/ML SOLUTION: Performed by: NURSE ANESTHETIST, CERTIFIED REGISTERED

## 2023-12-20 PROCEDURE — 76000 FLUOROSCOPY <1 HR PHYS/QHP: CPT | Performed by: RADIOLOGY

## 2023-12-20 PROCEDURE — 52353 CYSTOURETERO W/LITHOTRIPSY: CPT | Performed by: UROLOGY

## 2023-12-20 PROCEDURE — C2627 CATH, SUPRAPUBIC/CYSTOSCOPIC: HCPCS | Performed by: UROLOGY

## 2023-12-20 PROCEDURE — 25010000002 MIDAZOLAM PER 1 MG: Performed by: NURSE ANESTHETIST, CERTIFIED REGISTERED

## 2023-12-20 PROCEDURE — 74420 UROGRAPHY RTRGR +-KUB: CPT | Performed by: UROLOGY

## 2023-12-20 PROCEDURE — 25010000002 ONDANSETRON PER 1 MG: Performed by: NURSE ANESTHETIST, CERTIFIED REGISTERED

## 2023-12-20 PROCEDURE — 76000 FLUOROSCOPY <1 HR PHYS/QHP: CPT

## 2023-12-20 PROCEDURE — C1769 GUIDE WIRE: HCPCS | Performed by: UROLOGY

## 2023-12-20 PROCEDURE — 25510000001 IOPAMIDOL 61 % SOLUTION: Performed by: UROLOGY

## 2023-12-20 PROCEDURE — 25010000002 PROPOFOL 200 MG/20ML EMULSION: Performed by: NURSE ANESTHETIST, CERTIFIED REGISTERED

## 2023-12-20 PROCEDURE — 25010000002 GENTAMICIN PER 80 MG: Performed by: UROLOGY

## 2023-12-20 PROCEDURE — 52352 CYSTOURETERO W/STONE REMOVE: CPT | Performed by: UROLOGY

## 2023-12-20 RX ORDER — FAMOTIDINE 10 MG/ML
INJECTION, SOLUTION INTRAVENOUS AS NEEDED
Status: DISCONTINUED | OUTPATIENT
Start: 2023-12-20 | End: 2023-12-20 | Stop reason: SURG

## 2023-12-20 RX ORDER — ONDANSETRON 2 MG/ML
4 INJECTION INTRAMUSCULAR; INTRAVENOUS AS NEEDED
Status: DISCONTINUED | OUTPATIENT
Start: 2023-12-20 | End: 2023-12-20 | Stop reason: HOSPADM

## 2023-12-20 RX ORDER — LIDOCAINE HYDROCHLORIDE 20 MG/ML
JELLY TOPICAL AS NEEDED
Status: DISCONTINUED | OUTPATIENT
Start: 2023-12-20 | End: 2023-12-20 | Stop reason: HOSPADM

## 2023-12-20 RX ORDER — GENTAMICIN SULFATE 80 MG/100ML
80 INJECTION, SOLUTION INTRAVENOUS ONCE
Status: COMPLETED | OUTPATIENT
Start: 2023-12-20 | End: 2023-12-20

## 2023-12-20 RX ORDER — MEPERIDINE HYDROCHLORIDE 25 MG/ML
12.5 INJECTION INTRAMUSCULAR; INTRAVENOUS; SUBCUTANEOUS
Status: DISCONTINUED | OUTPATIENT
Start: 2023-12-20 | End: 2023-12-20 | Stop reason: HOSPADM

## 2023-12-20 RX ORDER — SODIUM CHLORIDE, SODIUM LACTATE, POTASSIUM CHLORIDE, CALCIUM CHLORIDE 600; 310; 30; 20 MG/100ML; MG/100ML; MG/100ML; MG/100ML
100 INJECTION, SOLUTION INTRAVENOUS ONCE AS NEEDED
Status: DISCONTINUED | OUTPATIENT
Start: 2023-12-20 | End: 2023-12-20 | Stop reason: HOSPADM

## 2023-12-20 RX ORDER — PROPOFOL 10 MG/ML
INJECTION, EMULSION INTRAVENOUS AS NEEDED
Status: DISCONTINUED | OUTPATIENT
Start: 2023-12-20 | End: 2023-12-20 | Stop reason: SURG

## 2023-12-20 RX ORDER — IPRATROPIUM BROMIDE AND ALBUTEROL SULFATE 2.5; .5 MG/3ML; MG/3ML
3 SOLUTION RESPIRATORY (INHALATION) ONCE AS NEEDED
Status: DISCONTINUED | OUTPATIENT
Start: 2023-12-20 | End: 2023-12-20 | Stop reason: HOSPADM

## 2023-12-20 RX ORDER — MAGNESIUM HYDROXIDE 1200 MG/15ML
LIQUID ORAL AS NEEDED
Status: DISCONTINUED | OUTPATIENT
Start: 2023-12-20 | End: 2023-12-20 | Stop reason: HOSPADM

## 2023-12-20 RX ORDER — FENTANYL CITRATE 50 UG/ML
50 INJECTION, SOLUTION INTRAMUSCULAR; INTRAVENOUS
Status: DISCONTINUED | OUTPATIENT
Start: 2023-12-20 | End: 2023-12-20 | Stop reason: HOSPADM

## 2023-12-20 RX ORDER — OXYCODONE HYDROCHLORIDE AND ACETAMINOPHEN 5; 325 MG/1; MG/1
1 TABLET ORAL ONCE AS NEEDED
Status: COMPLETED | OUTPATIENT
Start: 2023-12-20 | End: 2023-12-20

## 2023-12-20 RX ORDER — LIDOCAINE HYDROCHLORIDE 20 MG/ML
INJECTION, SOLUTION EPIDURAL; INFILTRATION; INTRACAUDAL; PERINEURAL AS NEEDED
Status: DISCONTINUED | OUTPATIENT
Start: 2023-12-20 | End: 2023-12-20 | Stop reason: SURG

## 2023-12-20 RX ORDER — FENTANYL CITRATE 50 UG/ML
INJECTION, SOLUTION INTRAMUSCULAR; INTRAVENOUS AS NEEDED
Status: DISCONTINUED | OUTPATIENT
Start: 2023-12-20 | End: 2023-12-20 | Stop reason: SURG

## 2023-12-20 RX ORDER — ONDANSETRON 2 MG/ML
INJECTION INTRAMUSCULAR; INTRAVENOUS AS NEEDED
Status: DISCONTINUED | OUTPATIENT
Start: 2023-12-20 | End: 2023-12-20 | Stop reason: SURG

## 2023-12-20 RX ORDER — MIDAZOLAM HYDROCHLORIDE 1 MG/ML
INJECTION INTRAMUSCULAR; INTRAVENOUS AS NEEDED
Status: DISCONTINUED | OUTPATIENT
Start: 2023-12-20 | End: 2023-12-20 | Stop reason: SURG

## 2023-12-20 RX ORDER — KETOROLAC TROMETHAMINE 10 MG/1
10 TABLET, FILM COATED ORAL EVERY 6 HOURS PRN
Qty: 16 TABLET | Refills: 2 | Status: SHIPPED | OUTPATIENT
Start: 2023-12-20

## 2023-12-20 RX ORDER — SODIUM CHLORIDE, SODIUM LACTATE, POTASSIUM CHLORIDE, CALCIUM CHLORIDE 600; 310; 30; 20 MG/100ML; MG/100ML; MG/100ML; MG/100ML
INJECTION, SOLUTION INTRAVENOUS CONTINUOUS PRN
Status: DISCONTINUED | OUTPATIENT
Start: 2023-12-20 | End: 2023-12-20 | Stop reason: SURG

## 2023-12-20 RX ORDER — KETOROLAC TROMETHAMINE 30 MG/ML
30 INJECTION, SOLUTION INTRAMUSCULAR; INTRAVENOUS EVERY 6 HOURS PRN
Status: DISCONTINUED | OUTPATIENT
Start: 2023-12-20 | End: 2023-12-20 | Stop reason: HOSPADM

## 2023-12-20 RX ADMIN — FENTANYL CITRATE 50 MCG: 50 INJECTION, SOLUTION INTRAMUSCULAR; INTRAVENOUS at 10:22

## 2023-12-20 RX ADMIN — LIDOCAINE HYDROCHLORIDE 60 MG: 20 INJECTION, SOLUTION EPIDURAL; INFILTRATION; INTRACAUDAL; PERINEURAL at 09:15

## 2023-12-20 RX ADMIN — ONDANSETRON 4 MG: 2 INJECTION INTRAMUSCULAR; INTRAVENOUS at 09:22

## 2023-12-20 RX ADMIN — OXYCODONE HYDROCHLORIDE AND ACETAMINOPHEN 1 TABLET: 5; 325 TABLET ORAL at 10:22

## 2023-12-20 RX ADMIN — FENTANYL CITRATE 100 MCG: 50 INJECTION INTRAMUSCULAR; INTRAVENOUS at 09:15

## 2023-12-20 RX ADMIN — SODIUM CHLORIDE, POTASSIUM CHLORIDE, SODIUM LACTATE AND CALCIUM CHLORIDE: 600; 310; 30; 20 INJECTION, SOLUTION INTRAVENOUS at 09:15

## 2023-12-20 RX ADMIN — FAMOTIDINE 20 MG: 10 INJECTION, SOLUTION INTRAVENOUS at 09:15

## 2023-12-20 RX ADMIN — PROPOFOL 200 MG: 10 INJECTION, EMULSION INTRAVENOUS at 09:18

## 2023-12-20 RX ADMIN — GENTAMICIN SULFATE 80 MG: 80 INJECTION, SOLUTION INTRAVENOUS at 09:15

## 2023-12-20 RX ADMIN — MIDAZOLAM HYDROCHLORIDE 2 MG: 1 INJECTION, SOLUTION INTRAMUSCULAR; INTRAVENOUS at 09:15

## 2023-12-20 NOTE — ANESTHESIA PREPROCEDURE EVALUATION
Anesthesia Evaluation     Patient summary reviewed and Nursing notes reviewed   no history of anesthetic complications:   NPO Solid Status: > 8 hours  NPO Liquid Status: > 8 hours           Airway   Mallampati: II  TM distance: >3 FB  Neck ROM: full  No difficulty expected  Dental    (+) poor dentition        Pulmonary - normal exam    breath sounds clear to auscultation  (+) a smoker Current, Abstained day of surgery,  Cardiovascular - negative cardio ROS and normal exam    ECG reviewed  Rhythm: regular  Rate: normal        Neuro/Psych  (+) psychiatric history Depression  GI/Hepatic/Renal/Endo    (+) obesity  (-) morbid obesity    Musculoskeletal (-) negative ROS    Abdominal   (+) obese   Substance History - negative use     OB/GYN negative ob/gyn ROS         Other - negative ROS                         Anesthesia Plan    ASA 2     general     (TAP blocks for post op pain control per surgeon request. )  intravenous induction     Anesthetic plan, risks, benefits, and alternatives have been provided, discussed and informed consent has been obtained with: patient.    Use of blood products discussed with  Consented to blood products.    Plan discussed with CRNA.        CODE STATUS:

## 2023-12-20 NOTE — OP NOTE
Lizbeth Ramirez  12/20/2023    Pre-op Diagnosis:   Right distal ureteral calculus [N20.1]    Post-op Diagnosis:     Post-Op Diagnosis Codes:     * Right distal ureteral calculus [N20.1]    Procedure/CPT® Codes:    53-year-old white female with extensive nephrolithiasis and a right 5 to 6 mm distal UVJ stone for ureteroscopy.  Following an informed consent brought the procedure suite prepped and draped in a low dorsolithotomy position I used the Rios 4.5 Maltese ureteroscope identified narrowed right orifice.  I gently dilated it with a [place] 612 under fluoroscopic monitoring I used the rigid ureteroscope broke the stone and multiple pieces and extracted each piece sequentially retrograde showed no extravasation no problems good free drainage in the bladder I will see her back in the office in about 3 weeks to consider surgical intervention of the upper tract stones.  She tolerated the procedure well  Procedure(s):  URETEROSCOPY LASER LITHOTRIPSY,  RETROGRADE PYELOGRAM AND BASKET RETRIEVAL OF STONE    Surgeon(s):  Prosper Crespo MD    Anesthesia: see anesthesia record    Staff:   Circulator: Latonia Hoffman RN  Scrub Person: Lesley Montague; Era Green    Estimated Blood Loss: none  Urine Voided: * No values recorded between 12/20/2023  9:14 AM and 12/20/2023  9:41 AM *    Specimens:                ID Type Source Tests Collected by Time   A : RIGHT KIDNEY STONE Tissue Kidney, Right TISSUE EXAM, [facility]) Prosper Crespo MD 12/20/2023 0926         Drains: None r  Findings: right distal calcium oxalate monohydrate stone with bilateral upper tract stones and no extravasation seen on retrograde    Blood: N/A    Complications: None    Grafts and Implants: None    Prosper Crespo MD     Date: 12/20/2023  Time: 09:41 EST

## 2023-12-20 NOTE — ANESTHESIA POSTPROCEDURE EVALUATION
Patient: Lizbeth Ramirez    Procedure Summary       Date: 12/20/23 Room / Location: [facility] OR 06 / [facility] OR    Anesthesia Start: 0914 Anesthesia Stop: 0941    Procedure: URETEROSCOPY LASER LITHOTRIPSY,  RETROGRADE PYELOGRAM AND BASKET RETRIEVAL OF STONE (Right) Diagnosis:       Right distal ureteral calculus      (Right distal ureteral calculus [N20.1])    Surgeons: Prosper Crespo MD Provider: Davidson Montgomery MD    Anesthesia Type: general ASA Status: 2            Anesthesia Type: general    Vitals  Vitals Value Taken Time   /81 12/20/23 1012   Temp 97.6 °F (36.4 °C) 12/20/23 0942   Pulse 99 12/20/23 1012   Resp 17 12/20/23 1012   SpO2 99 % 12/20/23 1012           Post Anesthesia Care and Evaluation    Patient location during evaluation: PACU  Patient participation: complete - patient participated  Level of consciousness: awake  Pain score: 0  Pain management: satisfactory to patient    Airway patency: patent  Anesthetic complications: No anesthetic complications  PONV Status: none  Cardiovascular status: hemodynamically stable  Respiratory status: nasal cannula  Hydration status: acceptable

## 2023-12-20 NOTE — ANESTHESIA PROCEDURE NOTES
Airway  Urgency: elective    Date/Time: 12/20/2023 9:18 AM  Airway not difficult    General Information and Staff    Patient location during procedure: OR  Anesthesiologist: Davidson Montgomery MD  CRNA/CAA: Jacki Hutchison CRNA    Indications and Patient Condition  Indications for airway management: airway protection    Preoxygenated: yes  Mask difficulty assessment: 0 - not attempted    Final Airway Details  Final airway type: supraglottic airway      Successful airway: classic  Size 4     Number of attempts at approach: 1  Assessment: lips, teeth, and gum same as pre-op    Additional Comments  LMA placed with no trauma noted. Patient tolerated well. Good seal. Secured.

## 2023-12-22 LAB — REF LAB TEST METHOD: NORMAL

## 2024-01-09 ENCOUNTER — OFFICE VISIT (OUTPATIENT)
Dept: UROLOGY | Facility: CLINIC | Age: 54
End: 2024-01-09
Payer: COMMERCIAL

## 2024-01-09 ENCOUNTER — HOSPITAL ENCOUNTER (OUTPATIENT)
Dept: GENERAL RADIOLOGY | Facility: HOSPITAL | Age: 54
Discharge: HOME | End: 2024-01-09
Admitting: UROLOGY
Payer: COMMERCIAL

## 2024-01-09 VITALS
SYSTOLIC BLOOD PRESSURE: 143 MMHG | DIASTOLIC BLOOD PRESSURE: 86 MMHG | HEART RATE: 105 BPM | WEIGHT: 190.4 LBS | HEIGHT: 63 IN | BODY MASS INDEX: 33.73 KG/M2

## 2024-01-09 DIAGNOSIS — N20.1 URETERAL CALCULUS: ICD-10-CM

## 2024-01-09 DIAGNOSIS — N20.1 RIGHT DISTAL URETERAL CALCULUS: Primary | ICD-10-CM

## 2024-01-09 PROCEDURE — 74018 RADEX ABDOMEN 1 VIEW: CPT

## 2024-01-09 PROCEDURE — 1159F MED LIST DOCD IN RCRD: CPT | Performed by: UROLOGY

## 2024-01-09 PROCEDURE — 99214 OFFICE O/P EST MOD 30 MIN: CPT | Performed by: UROLOGY

## 2024-01-09 PROCEDURE — 1160F RVW MEDS BY RX/DR IN RCRD: CPT | Performed by: UROLOGY

## 2024-01-09 NOTE — PROGRESS NOTES
Chief Complaint:      Chief Complaint   Patient presents with    Nephrolithiasis     Post op        HPI:   53 y.o. female that is post an emergent right ureteroscopy that was very successful.  He has bilateral renal stones the largest on the right being 7.2 mm is desirous of treatment.  Renal calculus-we discussed the presence of the stone. We discussed the various therapeutic options available including percutaneous nephrostolithotomy, ureteroscopy and extracorporeal shockwave  lithotripsy.  We discussed the risks of lithotripsy including the passage of stones, the development of a large string of stones in the distal ureter known as Steinstrasse.  In the 3% incidence of that we will need to proceed with a ureteroscopy for obstructing fragments.  Extremely rare incidence of renal hematoma and the significance of this.  We discussed percutaneous nephrostolithotomy and its use as well as the risks and benefits such as the need for postoperative hospitalization, and the risk of damage to the kidney and the remote risk of a nephrectomy.  We also discussed the use of ureteroscopy in the upper tracts.  That this is as a decreased success rate to completely remove the stones on the first option and that very likely a stent will be required requiring an additional procedure for removal.  We discussed the absolute relative indicators for intervention including the presence of sepsis and pain we cannot control ais the primary need for urgent intervention.  We discussed placement of a stent if indicated and the management of the stent as well.    Past Medical History:     Past Medical History:   Diagnosis Date    Depression     Kidney stones        Current Meds:     Current Outpatient Medications   Medication Sig Dispense Refill    ketorolac (TORADOL) 10 MG tablet Take 1 tablet by mouth Every 6 (Six) Hours As Needed for Moderate Pain. 16 tablet 2     No current facility-administered medications for this visit.         Allergies:      Allergies   Allergen Reactions    Erythromycin GI Intolerance        Past Surgical History:     Past Surgical History:   Procedure Laterality Date    APPENDECTOMY      CHOLECYSTECTOMY N/A 03/12/2022    Procedure: CHOLECYSTECTOMY LAPAROSCOPIC POSSIBLE OPEN CHOLECYSTECTOMY;  Surgeon: Sanaz Jones MD;  Location: [facility];  Service: General;  Laterality: N/A;    COLONOSCOPY      URETEROSCOPY LASER LITHOTRIPSY WITH STENT INSERTION Right 12/20/2023    Procedure: URETEROSCOPY LASER LITHOTRIPSY,  RETROGRADE PYELOGRAM AND BASKET RETRIEVAL OF STONE;  Surgeon: Prosper Crespo MD;  Location: [facility];  Service: Urology;  Laterality: Right;       Social History:     Social History     Socioeconomic History    Marital status:    Tobacco Use    Smoking status: Every Day     Packs/day: 1.00     Years: 15.00     Additional pack years: 0.00     Total pack years: 15.00     Types: Cigarettes    Smokeless tobacco: Never   Vaping Use    Vaping Use: Never used   Substance and Sexual Activity    Alcohol use: Never    Drug use: Never    Sexual activity: Defer       Family History:     History reviewed. No pertinent family history.    Review of Systems:     Review of Systems   Constitutional: Negative.  Negative for activity change, appetite change, chills, diaphoresis, fatigue and unexpected weight change.   HENT:  Negative for congestion, dental problem, drooling, ear discharge, ear pain, facial swelling, hearing loss, mouth sores, nosebleeds, postnasal drip, rhinorrhea, sinus pressure, sneezing, sore throat, tinnitus, trouble swallowing and voice change.    Eyes: Negative.  Negative for photophobia, pain, discharge, redness, itching and visual disturbance.   Respiratory: Negative.  Negative for apnea, cough, choking, chest tightness, shortness of breath, wheezing and stridor.    Cardiovascular: Negative.  Negative for chest pain, palpitations and leg swelling.   Gastrointestinal: Negative.   Negative for abdominal distention, abdominal pain, anal bleeding, blood in stool, constipation, diarrhea, nausea, rectal pain and vomiting.   Endocrine: Negative.  Negative for cold intolerance, heat intolerance, polydipsia, polyphagia and polyuria.   Genitourinary:  Positive for flank pain.   Musculoskeletal:  Negative for arthralgias, back pain, gait problem, joint swelling, myalgias, neck pain and neck stiffness.   Skin: Negative.  Negative for color change, pallor, rash and wound.   Allergic/Immunologic: Negative.  Negative for environmental allergies, food allergies and immunocompromised state.   Neurological: Negative.  Negative for dizziness, tremors, seizures, syncope, facial asymmetry, speech difficulty, weakness, light-headedness, numbness and headaches.   Hematological: Negative.  Negative for adenopathy. Does not bruise/bleed easily.   Psychiatric/Behavioral:  Negative for agitation, behavioral problems, confusion, decreased concentration, dysphoric mood, hallucinations, self-injury, sleep disturbance and suicidal ideas. The patient is not nervous/anxious and is not hyperactive.    All other systems reviewed and are negative.      Physical Exam:     Physical Exam  Constitutional:       Appearance: She is well-developed.   HENT:      Head: Normocephalic and atraumatic.      Right Ear: External ear normal.      Left Ear: External ear normal.   Eyes:      Conjunctiva/sclera: Conjunctivae normal.      Pupils: Pupils are equal, round, and reactive to light.   Cardiovascular:      Rate and Rhythm: Normal rate and regular rhythm.      Heart sounds: Normal heart sounds.   Pulmonary:      Effort: Pulmonary effort is normal.      Breath sounds: Normal breath sounds.   Abdominal:      General: Bowel sounds are normal. There is no distension.      Palpations: Abdomen is soft. There is no mass.      Tenderness: There is no abdominal tenderness. There is no guarding or rebound.   Genitourinary:     Vagina: No vaginal  discharge.   Musculoskeletal:         General: Normal range of motion.   Skin:     General: Skin is warm and dry.   Neurological:      Mental Status: She is alert.      Deep Tendon Reflexes: Reflexes are normal and symmetric.   Psychiatric:         Behavior: Behavior normal.         Thought Content: Thought content normal.         Judgment: Judgment normal.         I have reviewed the following portions of the patient's history: Allergies, current medications, past family history, past medical history, past social history, past surgical history, problem list, and ROS and confirm it is accurate.    Recent Image (CT and/or KUB):      CT Abdomen and Pelvis: No results found for this or any previous visit.       CT Stone Protocol: Results for orders placed during the hospital encounter of 12/19/23    CT Abdomen Pelvis Stone Protocol    Narrative  EXAMINATION: CT abdomen and pelvis without contrast    HISTORY: Right flank pain.    COMPARISON: None.    TECHNIQUE: Contiguous 4 mm thick slices were obtained through the  abdomen and pelvis without contrast. Coronal and sagittal reformats were  performed.    FINDINGS:    ABDOMEN:  No focal airspace disease is appreciated within the visualized lung  bases.    The lack of intravenous contrast material limits evaluation of the solid  abdominal viscera. However, no contour deforming lesion is appreciated  within the unenhanced liver, spleen, adrenal glands or pancreas. The  gallbladder is surgically absent. There is contour regularity within the  left kidney likely related to scarring. Bilateral renal calculi are  noted. Mild to moderate right hydronephrosis and hydroureter are noted  due to a 6 mm stone at the right ureterovesicular junction. No  adenopathy, free or loculated collection is appreciated within the upper  abdomen. A fat-containing periumbilical hernia is noted. No herniation  of bowel.    PELVIS:  There has been prior appendectomy. No bowel obstruction or  "perforation.  No gross bowel wall thickening. No free air. No significant free or  loculated fluid collection is identified.    Impression  1. Mild to moderate right hydronephrosis and hydroureter due to a 6 mm  stone at the right ureterovesicular junction.  2. Bilateral nephrolithiasis.  3. No bowel obstruction or perforation.    This report was finalized on 2023 2:59 AM by Darryl Pollard MD.       KUB: No results found for this or any previous visit.       Labs (past 3 months):      Admission on 2023, Discharged on 2023   Component Date Value Ref Range Status    Reference Lab Report 2023    Final                    Value:[facility]  [address] [place] [address]  Phone: [phone] or [phone]  Fax: [phone]  Teja Parada M.D., Medical Director    PATIENT NAME                                     LABORATORY NO.  [id number]   SAM SPARKS                                   [id number]  [id number]                                 AGE                    SEX   SSN              CLIENT REF #  [facility]                      53        1970      F     [id number]      [id number]    [address]                             REQUESTING M.D.           ATTENDING [place] [address]                           CONSUELO MANRIQUE  DATE COLLECTED            DATE RECEIVED          DATE REPORTED  2023    DIAGNOSIS:  KIDNEY STONE, RIGHT:  GROSS DIAGNOSIS:  Consistent with kidney stone    RLL/sm    CLINICAL HISTORY:  Right distal ureteral calculus    SPECIMENS RECEIVED:  KIDNEY STONE,                           RIGHT    Professional interpretation rendered by Teja Parada M.D., F.C.A.P. at  [facility], [address] [place] [address].    GROSS DESCRIPTION:  Specimen received fresh in a container labeled \"right kidney stone\" and consists  of a 0.2 x 0.1 x 0.1 cm " firm roughened stone.  No sections are submitted.  JTM/RLL    REVIEWED, DIAGNOSED AND ELECTRONICALLY  SIGNED BY:    Teja Parada M.D., F.C.A.P.  CPT CODES:  43065     Admission on 12/19/2023, Discharged on 12/19/2023   Component Date Value Ref Range Status    Glucose 12/19/2023 119 (H)  65 - 99 mg/dL Final    BUN 12/19/2023 14  6 - 20 mg/dL Final    Creatinine 12/19/2023 0.99  0.57 - 1.00 mg/dL Final    Sodium 12/19/2023 138  136 - 145 mmol/L Final    Potassium 12/19/2023 3.6  3.5 - 5.2 mmol/L Final    Chloride 12/19/2023 102  98 - 107 mmol/L Final    CO2 12/19/2023 23.9  22.0 - 29.0 mmol/L Final    Calcium 12/19/2023 9.1  8.6 - 10.5 mg/dL Final    Total Protein 12/19/2023 7.1  6.0 - 8.5 g/dL Final    Albumin 12/19/2023 4.1  3.5 - 5.2 g/dL Final    ALT (SGPT) 12/19/2023 51 (H)  1 - 33 U/L Final    AST (SGOT) 12/19/2023 37 (H)  1 - 32 U/L Final    Alkaline Phosphatase 12/19/2023 163 (H)  39 - 117 U/L Final    Total Bilirubin 12/19/2023 0.7  0.0 - 1.2 mg/dL Final    Globulin 12/19/2023 3.0  gm/dL Final    A/G Ratio 12/19/2023 1.4  g/dL Final    BUN/Creatinine Ratio 12/19/2023 14.1  7.0 - 25.0 Final    Anion Gap 12/19/2023 12.1  5.0 - 15.0 mmol/L Final    eGFR 12/19/2023 68.7  >60.0 mL/min/1.73 Final    Color, UA 12/19/2023 Yellow  Yellow, Straw Final    Appearance, UA 12/19/2023 Clear  Clear Final    pH, UA 12/19/2023 7.0  5.0 - 8.0 Final    Specific Gravity, UA 12/19/2023 1.007  1.005 - 1.030 Final    Glucose, UA 12/19/2023 Negative  Negative Final    Ketones, UA 12/19/2023 Negative  Negative Final    Bilirubin, UA 12/19/2023 Negative  Negative Final    Blood, UA 12/19/2023 Large (3+) (A)  Negative Final    Protein, UA 12/19/2023 Negative  Negative Final    Leuk Esterase, UA 12/19/2023 Negative  Negative Final    Nitrite, UA 12/19/2023 Negative  Negative Final    Urobilinogen, UA 12/19/2023 0.2 E.U./dL  0.2 - 1.0 E.U./dL Final    C-Reactive Protein 12/19/2023 1.03 (H)  0.00 - 0.50 mg/dL Final    WBC 12/19/2023  8.47  3.40 - 10.80 10*3/mm3 Final    RBC 12/19/2023 5.05  3.77 - 5.28 10*6/mm3 Final    Hemoglobin 12/19/2023 14.6  12.0 - 15.9 g/dL Final    Hematocrit 12/19/2023 41.7  34.0 - 46.6 % Final    MCV 12/19/2023 82.6  79.0 - 97.0 fL Final    MCH 12/19/2023 28.9  26.6 - 33.0 pg Final    MCHC 12/19/2023 35.0  31.5 - 35.7 g/dL Final    RDW 12/19/2023 12.3  12.3 - 15.4 % Final    RDW-SD 12/19/2023 37.1  37.0 - 54.0 fl Final    MPV 12/19/2023 10.2  6.0 - 12.0 fL Final    Platelets 12/19/2023 250  140 - 450 10*3/mm3 Final    Neutrophil % 12/19/2023 84.6 (H)  42.7 - 76.0 % Final    Lymphocyte % 12/19/2023 11.9 (L)  19.6 - 45.3 % Final    Monocyte % 12/19/2023 2.6 (L)  5.0 - 12.0 % Final    Eosinophil % 12/19/2023 0.0 (L)  0.3 - 6.2 % Final    Basophil % 12/19/2023 0.4  0.0 - 1.5 % Final    Immature Grans % 12/19/2023 0.5  0.0 - 0.5 % Final    Neutrophils, Absolute 12/19/2023 7.17 (H)  1.70 - 7.00 10*3/mm3 Final    Lymphocytes, Absolute 12/19/2023 1.01  0.70 - 3.10 10*3/mm3 Final    Monocytes, Absolute 12/19/2023 0.22  0.10 - 0.90 10*3/mm3 Final    Eosinophils, Absolute 12/19/2023 0.00  0.00 - 0.40 10*3/mm3 Final    Basophils, Absolute 12/19/2023 0.03  0.00 - 0.20 10*3/mm3 Final    Immature Grans, Absolute 12/19/2023 0.04  0.00 - 0.05 10*3/mm3 Final    nRBC 12/19/2023 0.0  0.0 - 0.2 /100 WBC Final    Extra Tube 12/19/2023 Hold for add-ons.   Final    Auto resulted.    Extra Tube 12/19/2023 hold for add-on   Final    Auto resulted    Extra Tube 12/19/2023 Hold for add-ons.   Final    Auto resulted.    Extra Tube 12/19/2023 Hold for add-ons.   Final    Auto resulted    RBC, UA 12/19/2023 Too Numerous to Count (A)  None Seen, 0-2 /HPF Final    WBC, UA 12/19/2023 0-2  None Seen, 0-2 /HPF Final    Urine culture not indicated.    Bacteria, UA 12/19/2023 None Seen  None Seen /HPF Final    Squamous Epithelial Cells, UA 12/19/2023 None Seen  None Seen, 0-2 /HPF Final    Hyaline Casts, UA 12/19/2023 None Seen  None Seen /LPF Final     Methodology 12/19/2023 Automated Microscopy   Final    THC, Screen, Urine 12/19/2023 Negative  Negative Final    Phencyclidine (PCP), Urine 12/19/2023 Negative  Negative Final    Cocaine Screen, Urine 12/19/2023 Negative  Negative Final    Methamphetamine, Ur 12/19/2023 Positive (A)  Negative Final    Opiate Screen 12/19/2023 Positive (A)  Negative Final    Amphetamine Screen, Urine 12/19/2023 Positive (A)  Negative Final    Benzodiazepine Screen, Urine 12/19/2023 Negative  Negative Final    Tricyclic Antidepressants Screen 12/19/2023 Negative  Negative Final    Methadone Screen, Urine 12/19/2023 Negative  Negative Final    Barbiturates Screen, Urine 12/19/2023 Negative  Negative Final    Oxycodone Screen, Urine 12/19/2023 Negative  Negative Final    Buprenorphine, Screen, Urine 12/19/2023 Negative  Negative Final    Fentanyl, Urine 12/19/2023 Positive (A)  Negative Final        Procedure:       Assessment/Plan:   Renal calculus-we discussed the presence of the stone. We discussed the various therapeutic options available including percutaneous nephrostolithotomy, ureteroscopy and extracorporeal shockwave  lithotripsy.  We discussed the risks of lithotripsy including the passage of stones, the development of a large string of stones in the distal ureter known as Steinstrasse.  In the 3% incidence of that we will need to proceed with a ureteroscopy for obstructing fragments.  Extremely rare incidence of renal hematoma and the significance of this.  We discussed percutaneous nephrostolithotomy and its use as well as the risks and benefits such as the need for postoperative hospitalization, and the risk of damage to the kidney and the remote risk of a nephrectomy.  We also discussed the use of ureteroscopy in the upper tracts.  That this is as a decreased success rate to completely remove the stones on the first option and that very likely a stent will be required requiring an additional procedure for removal.  We  discussed the absolute relative indicators for intervention including the presence of sepsis and pain we cannot control ais the primary need for urgent intervention.  We discussed placement of a stent if indicated and the management of the stent as well.  Has bilateral renal stones but a right 7.2 mm lower pole stone for lithotripsy.  Ureteral calculus-patient has been diagnosed with a ureteral calculus.  We have discussed the various parameters regarding spontaneous passage including the notion that a 2 mm stone has a high likelihood of spontaneous passage versus a larger stone being caught up in the upper areas of the urinary tract.  We also discussed the medical management of stone disease and the use of medical expulsive therapy in the form of Flomax.  This is used in an off label setting.  We discussed the indicators for intervention including  absolute indicators such as sepsis and uncontrollable severe pain, as well as the relative indicators of moderate pain that is well-controlled with various analgesia.  I also talked about nonoperative management including ambulation and increasing fluids and hot tub as being an effective adjuncts in the treatment of a ureteral stone.  Distal stone has been removed            This document has been electronically signed by CONSUELO MANRIQUE MD January 9, 2024 10:22 EST    Dictated Utilizing Dragon Dictation: Part of this note may be an electronic transcription/translation of spoken language to printed text using the Dragon Dictation System.

## 2024-01-09 NOTE — H&P (VIEW-ONLY)
Chief Complaint:      Chief Complaint   Patient presents with    Nephrolithiasis     Post op        HPI:   53 y.o. female that is post an emergent right ureteroscopy that was very successful.  He has bilateral renal stones the largest on the right being 7.2 mm is desirous of treatment.  Renal calculus-we discussed the presence of the stone. We discussed the various therapeutic options available including percutaneous nephrostolithotomy, ureteroscopy and extracorporeal shockwave  lithotripsy.  We discussed the risks of lithotripsy including the passage of stones, the development of a large string of stones in the distal ureter known as Steinstrasse.  In the 3% incidence of that we will need to proceed with a ureteroscopy for obstructing fragments.  Extremely rare incidence of renal hematoma and the significance of this.  We discussed percutaneous nephrostolithotomy and its use as well as the risks and benefits such as the need for postoperative hospitalization, and the risk of damage to the kidney and the remote risk of a nephrectomy.  We also discussed the use of ureteroscopy in the upper tracts.  That this is as a decreased success rate to completely remove the stones on the first option and that very likely a stent will be required requiring an additional procedure for removal.  We discussed the absolute relative indicators for intervention including the presence of sepsis and pain we cannot control ais the primary need for urgent intervention.  We discussed placement of a stent if indicated and the management of the stent as well.    Past Medical History:     Past Medical History:   Diagnosis Date    Depression     Kidney stones        Current Meds:     Current Outpatient Medications   Medication Sig Dispense Refill    ketorolac (TORADOL) 10 MG tablet Take 1 tablet by mouth Every 6 (Six) Hours As Needed for Moderate Pain. 16 tablet 2     No current facility-administered medications for this visit.         Allergies:      Allergies   Allergen Reactions    Erythromycin GI Intolerance        Past Surgical History:     Past Surgical History:   Procedure Laterality Date    APPENDECTOMY      CHOLECYSTECTOMY N/A 03/12/2022    Procedure: CHOLECYSTECTOMY LAPAROSCOPIC POSSIBLE OPEN CHOLECYSTECTOMY;  Surgeon: Sanaz Jones MD;  Location: [facility];  Service: General;  Laterality: N/A;    COLONOSCOPY      URETEROSCOPY LASER LITHOTRIPSY WITH STENT INSERTION Right 12/20/2023    Procedure: URETEROSCOPY LASER LITHOTRIPSY,  RETROGRADE PYELOGRAM AND BASKET RETRIEVAL OF STONE;  Surgeon: Prosper Crespo MD;  Location: [facility];  Service: Urology;  Laterality: Right;       Social History:     Social History     Socioeconomic History    Marital status:    Tobacco Use    Smoking status: Every Day     Packs/day: 1.00     Years: 15.00     Additional pack years: 0.00     Total pack years: 15.00     Types: Cigarettes    Smokeless tobacco: Never   Vaping Use    Vaping Use: Never used   Substance and Sexual Activity    Alcohol use: Never    Drug use: Never    Sexual activity: Defer       Family History:     History reviewed. No pertinent family history.    Review of Systems:     Review of Systems   Constitutional: Negative.  Negative for activity change, appetite change, chills, diaphoresis, fatigue and unexpected weight change.   HENT:  Negative for congestion, dental problem, drooling, ear discharge, ear pain, facial swelling, hearing loss, mouth sores, nosebleeds, postnasal drip, rhinorrhea, sinus pressure, sneezing, sore throat, tinnitus, trouble swallowing and voice change.    Eyes: Negative.  Negative for photophobia, pain, discharge, redness, itching and visual disturbance.   Respiratory: Negative.  Negative for apnea, cough, choking, chest tightness, shortness of breath, wheezing and stridor.    Cardiovascular: Negative.  Negative for chest pain, palpitations and leg swelling.   Gastrointestinal: Negative.   Negative for abdominal distention, abdominal pain, anal bleeding, blood in stool, constipation, diarrhea, nausea, rectal pain and vomiting.   Endocrine: Negative.  Negative for cold intolerance, heat intolerance, polydipsia, polyphagia and polyuria.   Genitourinary:  Positive for flank pain.   Musculoskeletal:  Negative for arthralgias, back pain, gait problem, joint swelling, myalgias, neck pain and neck stiffness.   Skin: Negative.  Negative for color change, pallor, rash and wound.   Allergic/Immunologic: Negative.  Negative for environmental allergies, food allergies and immunocompromised state.   Neurological: Negative.  Negative for dizziness, tremors, seizures, syncope, facial asymmetry, speech difficulty, weakness, light-headedness, numbness and headaches.   Hematological: Negative.  Negative for adenopathy. Does not bruise/bleed easily.   Psychiatric/Behavioral:  Negative for agitation, behavioral problems, confusion, decreased concentration, dysphoric mood, hallucinations, self-injury, sleep disturbance and suicidal ideas. The patient is not nervous/anxious and is not hyperactive.    All other systems reviewed and are negative.      Physical Exam:     Physical Exam  Constitutional:       Appearance: She is well-developed.   HENT:      Head: Normocephalic and atraumatic.      Right Ear: External ear normal.      Left Ear: External ear normal.   Eyes:      Conjunctiva/sclera: Conjunctivae normal.      Pupils: Pupils are equal, round, and reactive to light.   Cardiovascular:      Rate and Rhythm: Normal rate and regular rhythm.      Heart sounds: Normal heart sounds.   Pulmonary:      Effort: Pulmonary effort is normal.      Breath sounds: Normal breath sounds.   Abdominal:      General: Bowel sounds are normal. There is no distension.      Palpations: Abdomen is soft. There is no mass.      Tenderness: There is no abdominal tenderness. There is no guarding or rebound.   Genitourinary:     Vagina: No vaginal  discharge.   Musculoskeletal:         General: Normal range of motion.   Skin:     General: Skin is warm and dry.   Neurological:      Mental Status: She is alert.      Deep Tendon Reflexes: Reflexes are normal and symmetric.   Psychiatric:         Behavior: Behavior normal.         Thought Content: Thought content normal.         Judgment: Judgment normal.         I have reviewed the following portions of the patient's history: Allergies, current medications, past family history, past medical history, past social history, past surgical history, problem list, and ROS and confirm it is accurate.    Recent Image (CT and/or KUB):      CT Abdomen and Pelvis: No results found for this or any previous visit.       CT Stone Protocol: Results for orders placed during the hospital encounter of 12/19/23    CT Abdomen Pelvis Stone Protocol    Narrative  EXAMINATION: CT abdomen and pelvis without contrast    HISTORY: Right flank pain.    COMPARISON: None.    TECHNIQUE: Contiguous 4 mm thick slices were obtained through the  abdomen and pelvis without contrast. Coronal and sagittal reformats were  performed.    FINDINGS:    ABDOMEN:  No focal airspace disease is appreciated within the visualized lung  bases.    The lack of intravenous contrast material limits evaluation of the solid  abdominal viscera. However, no contour deforming lesion is appreciated  within the unenhanced liver, spleen, adrenal glands or pancreas. The  gallbladder is surgically absent. There is contour regularity within the  left kidney likely related to scarring. Bilateral renal calculi are  noted. Mild to moderate right hydronephrosis and hydroureter are noted  due to a 6 mm stone at the right ureterovesicular junction. No  adenopathy, free or loculated collection is appreciated within the upper  abdomen. A fat-containing periumbilical hernia is noted. No herniation  of bowel.    PELVIS:  There has been prior appendectomy. No bowel obstruction or  "perforation.  No gross bowel wall thickening. No free air. No significant free or  loculated fluid collection is identified.    Impression  1. Mild to moderate right hydronephrosis and hydroureter due to a 6 mm  stone at the right ureterovesicular junction.  2. Bilateral nephrolithiasis.  3. No bowel obstruction or perforation.    This report was finalized on 2023 2:59 AM by Darryl Pollard MD.       KUB: No results found for this or any previous visit.       Labs (past 3 months):      Admission on 2023, Discharged on 2023   Component Date Value Ref Range Status    Reference Lab Report 2023    Final                    Value:[facility]  [address] [place] [address]  Phone: [phone] or [phone]  Fax: [phone]  Teja Parada M.D., Medical Director    PATIENT NAME                                     LABORATORY NO.  [id number]   SAM SPARKS                                   [id number]  [id number]                                 AGE                    SEX   SSN              CLIENT REF #  [facility]                      53        1970      F     [id number]      [id number]    [address]                             REQUESTING M.D.           ATTENDING [place] [address]                           CONSUELO MANRIQUE  DATE COLLECTED            DATE RECEIVED          DATE REPORTED  2023    DIAGNOSIS:  KIDNEY STONE, RIGHT:  GROSS DIAGNOSIS:  Consistent with kidney stone    RLL/sm    CLINICAL HISTORY:  Right distal ureteral calculus    SPECIMENS RECEIVED:  KIDNEY STONE,                           RIGHT    Professional interpretation rendered by Teja Parada M.D., F.C.A.P. at  [facility], [address] [place] [address].    GROSS DESCRIPTION:  Specimen received fresh in a container labeled \"right kidney stone\" and consists  of a 0.2 x 0.1 x 0.1 cm " firm roughened stone.  No sections are submitted.  JTM/RLL    REVIEWED, DIAGNOSED AND ELECTRONICALLY  SIGNED BY:    Teja Parada M.D., F.C.A.P.  CPT CODES:  [id number]     Admission on 12/19/2023, Discharged on 12/19/2023   Component Date Value Ref Range Status    Glucose 12/19/2023 119 (H)  65 - 99 mg/dL Final    BUN 12/19/2023 14  6 - 20 mg/dL Final    Creatinine 12/19/2023 0.99  0.57 - 1.00 mg/dL Final    Sodium 12/19/2023 138  136 - 145 mmol/L Final    Potassium 12/19/2023 3.6  3.5 - 5.2 mmol/L Final    Chloride 12/19/2023 102  98 - 107 mmol/L Final    CO2 12/19/2023 23.9  22.0 - 29.0 mmol/L Final    Calcium 12/19/2023 9.1  8.6 - 10.5 mg/dL Final    Total Protein 12/19/2023 7.1  6.0 - 8.5 g/dL Final    Albumin 12/19/2023 4.1  3.5 - 5.2 g/dL Final    ALT (SGPT) 12/19/2023 51 (H)  1 - 33 U/L Final    AST (SGOT) 12/19/2023 37 (H)  1 - 32 U/L Final    Alkaline Phosphatase 12/19/2023 163 (H)  39 - 117 U/L Final    Total Bilirubin 12/19/2023 0.7  0.0 - 1.2 mg/dL Final    Globulin 12/19/2023 3.0  gm/dL Final    A/G Ratio 12/19/2023 1.4  g/dL Final    BUN/Creatinine Ratio 12/19/2023 14.1  7.0 - 25.0 Final    Anion Gap 12/19/2023 12.1  5.0 - 15.0 mmol/L Final    eGFR 12/19/2023 68.7  >60.0 mL/min/1.73 Final    Color, UA 12/19/2023 Yellow  Yellow, Straw Final    Appearance, UA 12/19/2023 Clear  Clear Final    pH, UA 12/19/2023 7.0  5.0 - 8.0 Final    Specific Gravity, UA 12/19/2023 1.007  1.005 - 1.030 Final    Glucose, UA 12/19/2023 Negative  Negative Final    Ketones, UA 12/19/2023 Negative  Negative Final    Bilirubin, UA 12/19/2023 Negative  Negative Final    Blood, UA 12/19/2023 Large (3+) (A)  Negative Final    Protein, UA 12/19/2023 Negative  Negative Final    Leuk Esterase, UA 12/19/2023 Negative  Negative Final    Nitrite, UA 12/19/2023 Negative  Negative Final    Urobilinogen, UA 12/19/2023 0.2 E.U./dL  0.2 - 1.0 E.U./dL Final    C-Reactive Protein 12/19/2023 1.03 (H)  0.00 - 0.50 mg/dL Final    WBC 12/19/2023  8.47  3.40 - 10.80 10*3/mm3 Final    RBC 12/19/2023 5.05  3.77 - 5.28 10*6/mm3 Final    Hemoglobin 12/19/2023 14.6  12.0 - 15.9 g/dL Final    Hematocrit 12/19/2023 41.7  34.0 - 46.6 % Final    MCV 12/19/2023 82.6  79.0 - 97.0 fL Final    MCH 12/19/2023 28.9  26.6 - 33.0 pg Final    MCHC 12/19/2023 35.0  31.5 - 35.7 g/dL Final    RDW 12/19/2023 12.3  12.3 - 15.4 % Final    RDW-SD 12/19/2023 37.1  37.0 - 54.0 fl Final    MPV 12/19/2023 10.2  6.0 - 12.0 fL Final    Platelets 12/19/2023 250  140 - 450 10*3/mm3 Final    Neutrophil % 12/19/2023 84.6 (H)  42.7 - 76.0 % Final    Lymphocyte % 12/19/2023 11.9 (L)  19.6 - 45.3 % Final    Monocyte % 12/19/2023 2.6 (L)  5.0 - 12.0 % Final    Eosinophil % 12/19/2023 0.0 (L)  0.3 - 6.2 % Final    Basophil % 12/19/2023 0.4  0.0 - 1.5 % Final    Immature Grans % 12/19/2023 0.5  0.0 - 0.5 % Final    Neutrophils, Absolute 12/19/2023 7.17 (H)  1.70 - 7.00 10*3/mm3 Final    Lymphocytes, Absolute 12/19/2023 1.01  0.70 - 3.10 10*3/mm3 Final    Monocytes, Absolute 12/19/2023 0.22  0.10 - 0.90 10*3/mm3 Final    Eosinophils, Absolute 12/19/2023 0.00  0.00 - 0.40 10*3/mm3 Final    Basophils, Absolute 12/19/2023 0.03  0.00 - 0.20 10*3/mm3 Final    Immature Grans, Absolute 12/19/2023 0.04  0.00 - 0.05 10*3/mm3 Final    nRBC 12/19/2023 0.0  0.0 - 0.2 /100 WBC Final    Extra Tube 12/19/2023 Hold for add-ons.   Final    Auto resulted.    Extra Tube 12/19/2023 hold for add-on   Final    Auto resulted    Extra Tube 12/19/2023 Hold for add-ons.   Final    Auto resulted.    Extra Tube 12/19/2023 Hold for add-ons.   Final    Auto resulted    RBC, UA 12/19/2023 Too Numerous to Count (A)  None Seen, 0-2 /HPF Final    WBC, UA 12/19/2023 0-2  None Seen, 0-2 /HPF Final    Urine culture not indicated.    Bacteria, UA 12/19/2023 None Seen  None Seen /HPF Final    Squamous Epithelial Cells, UA 12/19/2023 None Seen  None Seen, 0-2 /HPF Final    Hyaline Casts, UA 12/19/2023 None Seen  None Seen /LPF Final     Methodology 12/19/2023 Automated Microscopy   Final    THC, Screen, Urine 12/19/2023 Negative  Negative Final    Phencyclidine (PCP), Urine 12/19/2023 Negative  Negative Final    Cocaine Screen, Urine 12/19/2023 Negative  Negative Final    Methamphetamine, Ur 12/19/2023 Positive (A)  Negative Final    Opiate Screen 12/19/2023 Positive (A)  Negative Final    Amphetamine Screen, Urine 12/19/2023 Positive (A)  Negative Final    Benzodiazepine Screen, Urine 12/19/2023 Negative  Negative Final    Tricyclic Antidepressants Screen 12/19/2023 Negative  Negative Final    Methadone Screen, Urine 12/19/2023 Negative  Negative Final    Barbiturates Screen, Urine 12/19/2023 Negative  Negative Final    Oxycodone Screen, Urine 12/19/2023 Negative  Negative Final    Buprenorphine, Screen, Urine 12/19/2023 Negative  Negative Final    Fentanyl, Urine 12/19/2023 Positive (A)  Negative Final        Procedure:       Assessment/Plan:   Renal calculus-we discussed the presence of the stone. We discussed the various therapeutic options available including percutaneous nephrostolithotomy, ureteroscopy and extracorporeal shockwave  lithotripsy.  We discussed the risks of lithotripsy including the passage of stones, the development of a large string of stones in the distal ureter known as Steinstrasse.  In the 3% incidence of that we will need to proceed with a ureteroscopy for obstructing fragments.  Extremely rare incidence of renal hematoma and the significance of this.  We discussed percutaneous nephrostolithotomy and its use as well as the risks and benefits such as the need for postoperative hospitalization, and the risk of damage to the kidney and the remote risk of a nephrectomy.  We also discussed the use of ureteroscopy in the upper tracts.  That this is as a decreased success rate to completely remove the stones on the first option and that very likely a stent will be required requiring an additional procedure for removal.  We  discussed the absolute relative indicators for intervention including the presence of sepsis and pain we cannot control ais the primary need for urgent intervention.  We discussed placement of a stent if indicated and the management of the stent as well.  Has bilateral renal stones but a right 7.2 mm lower pole stone for lithotripsy.  Ureteral calculus-patient has been diagnosed with a ureteral calculus.  We have discussed the various parameters regarding spontaneous passage including the notion that a 2 mm stone has a high likelihood of spontaneous passage versus a larger stone being caught up in the upper areas of the urinary tract.  We also discussed the medical management of stone disease and the use of medical expulsive therapy in the form of Flomax.  This is used in an off label setting.  We discussed the indicators for intervention including  absolute indicators such as sepsis and uncontrollable severe pain, as well as the relative indicators of moderate pain that is well-controlled with various analgesia.  I also talked about nonoperative management including ambulation and increasing fluids and hot tub as being an effective adjuncts in the treatment of a ureteral stone.  Distal stone has been removed            This document has been electronically signed by CONSUELO MANRIQUE MD January 9, 2024 10:22 EST    Dictated Utilizing Dragon Dictation: Part of this note may be an electronic transcription/translation of spoken language to printed text using the Dragon Dictation System.

## 2024-01-11 DIAGNOSIS — N20.1 RIGHT DISTAL URETERAL CALCULUS: Primary | ICD-10-CM

## 2024-01-19 ENCOUNTER — TELEPHONE (OUTPATIENT)
Dept: UROLOGY | Facility: CLINIC | Age: 54
End: 2024-01-19
Payer: COMMERCIAL

## 2024-01-23 ENCOUNTER — OFFICE VISIT (OUTPATIENT)
Dept: UROLOGY | Facility: CLINIC | Age: 54
End: 2024-01-23
Payer: COMMERCIAL

## 2024-01-23 ENCOUNTER — HOSPITAL ENCOUNTER (OUTPATIENT)
Dept: GENERAL RADIOLOGY | Facility: HOSPITAL | Age: 54
Discharge: HOME | End: 2024-01-23
Admitting: UROLOGY
Payer: COMMERCIAL

## 2024-01-23 VITALS
BODY MASS INDEX: 33.2 KG/M2 | WEIGHT: 187.4 LBS | HEART RATE: 114 BPM | DIASTOLIC BLOOD PRESSURE: 105 MMHG | HEIGHT: 63 IN | SYSTOLIC BLOOD PRESSURE: 180 MMHG

## 2024-01-23 DIAGNOSIS — N20.0 RENAL CALCULUS, BILATERAL: ICD-10-CM

## 2024-01-23 DIAGNOSIS — N20.1 URETERAL CALCULUS: Primary | ICD-10-CM

## 2024-01-23 DIAGNOSIS — N20.1 RIGHT DISTAL URETERAL CALCULUS: ICD-10-CM

## 2024-01-23 PROCEDURE — 74018 RADEX ABDOMEN 1 VIEW: CPT | Performed by: RADIOLOGY

## 2024-01-23 PROCEDURE — 74018 RADEX ABDOMEN 1 VIEW: CPT

## 2024-01-23 PROCEDURE — 99213 OFFICE O/P EST LOW 20 MIN: CPT | Performed by: UROLOGY

## 2024-01-23 PROCEDURE — 1160F RVW MEDS BY RX/DR IN RCRD: CPT | Performed by: UROLOGY

## 2024-01-23 PROCEDURE — 1159F MED LIST DOCD IN RCRD: CPT | Performed by: UROLOGY

## 2024-01-23 NOTE — PROGRESS NOTES
Chief Complaint:      Chief Complaint   Patient presents with    Kidney Stone       HPI:   53 y.o. female known to me status post ureteroscopic stone extraction she has bilateral stones a painful left 5 mm renal pelvic stone and to right renal stones.  I will set her up for as well per her request.    Past Medical History:     Past Medical History:   Diagnosis Date    Depression     Kidney stones        Current Meds:     Current Outpatient Medications   Medication Sig Dispense Refill    ketorolac (TORADOL) 10 MG tablet Take 1 tablet by mouth Every 6 (Six) Hours As Needed for Moderate Pain. 16 tablet 2     No current facility-administered medications for this visit.        Allergies:      Allergies   Allergen Reactions    Erythromycin GI Intolerance        Past Surgical History:     Past Surgical History:   Procedure Laterality Date    APPENDECTOMY      CHOLECYSTECTOMY N/A 03/12/2022    Procedure: CHOLECYSTECTOMY LAPAROSCOPIC POSSIBLE OPEN CHOLECYSTECTOMY;  Surgeon: Sanaz Jones MD;  Location: [facility];  Service: General;  Laterality: N/A;    COLONOSCOPY      URETEROSCOPY LASER LITHOTRIPSY WITH STENT INSERTION Right 12/20/2023    Procedure: URETEROSCOPY LASER LITHOTRIPSY,  RETROGRADE PYELOGRAM AND BASKET RETRIEVAL OF STONE;  Surgeon: Prosper Crespo MD;  Location: [facility];  Service: Urology;  Laterality: Right;       Social History:     Social History     Socioeconomic History    Marital status:    Tobacco Use    Smoking status: Every Day     Packs/day: 1.00     Years: 15.00     Additional pack years: 0.00     Total pack years: 15.00     Types: Cigarettes    Smokeless tobacco: Never   Vaping Use    Vaping Use: Never used   Substance and Sexual Activity    Alcohol use: Never    Drug use: Never    Sexual activity: Defer       Family History:     No family history on file.    Review of Systems:     Review of Systems   Constitutional: Negative.  Negative for activity change, appetite change,  chills, diaphoresis, fatigue and unexpected weight change.   HENT:  Negative for congestion, dental problem, drooling, ear discharge, ear pain, facial swelling, hearing loss, mouth sores, nosebleeds, postnasal drip, rhinorrhea, sinus pressure, sneezing, sore throat, tinnitus, trouble swallowing and voice change.    Eyes: Negative.  Negative for photophobia, pain, discharge, redness, itching and visual disturbance.   Respiratory: Negative.  Negative for apnea, cough, choking, chest tightness, shortness of breath, wheezing and stridor.    Cardiovascular: Negative.  Negative for chest pain, palpitations and leg swelling.   Gastrointestinal: Negative.  Negative for abdominal distention, abdominal pain, anal bleeding, blood in stool, constipation, diarrhea, nausea, rectal pain and vomiting.   Endocrine: Negative.  Negative for cold intolerance, heat intolerance, polydipsia, polyphagia and polyuria.   Genitourinary:  Positive for flank pain.   Musculoskeletal:  Negative for arthralgias, back pain, gait problem, joint swelling, myalgias, neck pain and neck stiffness.   Skin: Negative.  Negative for color change, pallor, rash and wound.   Allergic/Immunologic: Negative.  Negative for environmental allergies, food allergies and immunocompromised state.   Neurological: Negative.  Negative for dizziness, tremors, seizures, syncope, facial asymmetry, speech difficulty, weakness, light-headedness, numbness and headaches.   Hematological: Negative.  Negative for adenopathy. Does not bruise/bleed easily.   Psychiatric/Behavioral:  Negative for agitation, behavioral problems, confusion, decreased concentration, dysphoric mood, hallucinations, self-injury, sleep disturbance and suicidal ideas. The patient is not nervous/anxious and is not hyperactive.    All other systems reviewed and are negative.      Physical Exam:     Physical Exam  Constitutional:       Appearance: She is well-developed.   HENT:      Head: Normocephalic and  atraumatic.      Right Ear: External ear normal.      Left Ear: External ear normal.   Eyes:      Conjunctiva/sclera: Conjunctivae normal.      Pupils: Pupils are equal, round, and reactive to light.   Cardiovascular:      Rate and Rhythm: Normal rate and regular rhythm.      Heart sounds: Normal heart sounds.   Pulmonary:      Effort: Pulmonary effort is normal.      Breath sounds: Normal breath sounds.   Abdominal:      General: Bowel sounds are normal. There is no distension.      Palpations: Abdomen is soft. There is no mass.      Tenderness: There is no abdominal tenderness. There is no guarding or rebound.   Genitourinary:     Vagina: No vaginal discharge.   Musculoskeletal:         General: Normal range of motion.   Skin:     General: Skin is warm and dry.   Neurological:      Mental Status: She is alert.      Deep Tendon Reflexes: Reflexes are normal and symmetric.   Psychiatric:         Behavior: Behavior normal.         Thought Content: Thought content normal.         Judgment: Judgment normal.         I have reviewed the following portions of the patient's history: Allergies, current medications, past family history, past medical history, past social history, past surgical history, problem list, and ROS and confirm it is accurate.    Recent Image (CT and/or KUB):      CT Abdomen and Pelvis: No results found for this or any previous visit.       CT Stone Protocol: Results for orders placed during the hospital encounter of 12/19/23    CT Abdomen Pelvis Stone Protocol    Narrative  EXAMINATION: CT abdomen and pelvis without contrast    HISTORY: Right flank pain.    COMPARISON: None.    TECHNIQUE: Contiguous 4 mm thick slices were obtained through the  abdomen and pelvis without contrast. Coronal and sagittal reformats were  performed.    FINDINGS:    ABDOMEN:  No focal airspace disease is appreciated within the visualized lung  bases.    The lack of intravenous contrast material limits evaluation of the  solid  abdominal viscera. However, no contour deforming lesion is appreciated  within the unenhanced liver, spleen, adrenal glands or pancreas. The  gallbladder is surgically absent. There is contour regularity within the  left kidney likely related to scarring. Bilateral renal calculi are  noted. Mild to moderate right hydronephrosis and hydroureter are noted  due to a 6 mm stone at the right ureterovesicular junction. No  adenopathy, free or loculated collection is appreciated within the upper  abdomen. A fat-containing periumbilical hernia is noted. No herniation  of bowel.    PELVIS:  There has been prior appendectomy. No bowel obstruction or perforation.  No gross bowel wall thickening. No free air. No significant free or  loculated fluid collection is identified.    Impression  1. Mild to moderate right hydronephrosis and hydroureter due to a 6 mm  stone at the right ureterovesicular junction.  2. Bilateral nephrolithiasis.  3. No bowel obstruction or perforation.    This report was finalized on 12/19/2023 2:59 AM by Darryl Pollard MD.       KUB: Results for orders placed in visit on 01/09/24    XR Abdomen KUB    Narrative  PROCEDURE: XR ABDOMEN KUB-    HISTORY: stones; N20.1-Calculus of ureter    COMPARISON: None.    FINDINGS: An AP view of the abdomen and pelvis demonstrates an  unremarkable bowel gas pattern with no evidence of obstruction. There  are calcifications overlying the renal shadows consistent with  nephrolithiasis. Calcifications in the pelvis are felt to reflect  phleboliths.    Impression  Bilateral nephrolithiasis with suspected phleboliths in the  pelvis.          This report was finalized on 1/9/2024 11:05 AM by Anselmo Kim M.D..       Labs (past 3 months):      Admission on 12/20/2023, Discharged on 12/20/2023   Component Date Value Ref Range Status    Reference Lab Report 12/20/2023    Final                    Value:[facility]  [address] [place] [address]  "[place]  [address]  Phone: [phone] or [phone]  Fax: [phone]  Teja Parada M.D., Medical Director    PATIENT NAME                                     LABORATORY NO.  [id number]   SAM SPARKS                                   [id number]  [id number]                                 AGE                    SEX   SSN              CLIENT REF #  [facility]                      53        1970      F     [id number]      [id number]    [address]                             REQUESTING M.D.           ATTENDING M.D.         [place] [address]                           CONSUELO MANRIQUE  DATE COLLECTED            DATE RECEIVED          DATE REPORTED  2023    DIAGNOSIS:  KIDNEY STONE, RIGHT:  GROSS DIAGNOSIS:  Consistent with kidney stone    RLL/sm    CLINICAL HISTORY:  Right distal ureteral calculus    SPECIMENS RECEIVED:  KIDNEY STONE,                           RIGHT    Professional interpretation rendered by Teja Parada M.D., BRANDON.C.A.P. at  [facility], [employer], [address] [place] [address].    GROSS DESCRIPTION:  Specimen received fresh in a container labeled \"right kidney stone\" and consists  of a 0.2 x 0.1 x 0.1 cm firm roughened stone.  No sections are submitted.  JTM/RLL    REVIEWED, DIAGNOSED AND ELECTRONICALLY  SIGNED BY:    Teja Parada M.D., F.C.A.P.  CPT CODES:  79112     Admission on 2023, Discharged on 2023   Component Date Value Ref Range Status    Glucose 2023 119 (H)  65 - 99 mg/dL Final    BUN 2023 14  6 - 20 mg/dL Final    Creatinine 2023 0.99  0.57 - 1.00 mg/dL Final    Sodium 2023 138  136 - 145 mmol/L Final    Potassium 2023 3.6  3.5 - 5.2 mmol/L Final    Chloride 2023 102  98 - 107 mmol/L Final    CO2 2023 23.9  22.0 - 29.0 mmol/L Final    Calcium 2023 9.1  8.6 - 10.5 mg/dL Final    Total Protein 2023 7.1  6.0 - 8.5 g/dL Final    " Albumin 12/19/2023 4.1  3.5 - 5.2 g/dL Final    ALT (SGPT) 12/19/2023 51 (H)  1 - 33 U/L Final    AST (SGOT) 12/19/2023 37 (H)  1 - 32 U/L Final    Alkaline Phosphatase 12/19/2023 163 (H)  39 - 117 U/L Final    Total Bilirubin 12/19/2023 0.7  0.0 - 1.2 mg/dL Final    Globulin 12/19/2023 3.0  gm/dL Final    A/G Ratio 12/19/2023 1.4  g/dL Final    BUN/Creatinine Ratio 12/19/2023 14.1  7.0 - 25.0 Final    Anion Gap 12/19/2023 12.1  5.0 - 15.0 mmol/L Final    eGFR 12/19/2023 68.7  >60.0 mL/min/1.73 Final    Color, UA 12/19/2023 Yellow  Yellow, Straw Final    Appearance, UA 12/19/2023 Clear  Clear Final    pH, UA 12/19/2023 7.0  5.0 - 8.0 Final    Specific Gravity, UA 12/19/2023 1.007  1.005 - 1.030 Final    Glucose, UA 12/19/2023 Negative  Negative Final    Ketones, UA 12/19/2023 Negative  Negative Final    Bilirubin, UA 12/19/2023 Negative  Negative Final    Blood, UA 12/19/2023 Large (3+) (A)  Negative Final    Protein, UA 12/19/2023 Negative  Negative Final    Leuk Esterase, UA 12/19/2023 Negative  Negative Final    Nitrite, UA 12/19/2023 Negative  Negative Final    Urobilinogen, UA 12/19/2023 0.2 E.U./dL  0.2 - 1.0 E.U./dL Final    C-Reactive Protein 12/19/2023 1.03 (H)  0.00 - 0.50 mg/dL Final    WBC 12/19/2023 8.47  3.40 - 10.80 10*3/mm3 Final    RBC 12/19/2023 5.05  3.77 - 5.28 10*6/mm3 Final    Hemoglobin 12/19/2023 14.6  12.0 - 15.9 g/dL Final    Hematocrit 12/19/2023 41.7  34.0 - 46.6 % Final    MCV 12/19/2023 82.6  79.0 - 97.0 fL Final    MCH 12/19/2023 28.9  26.6 - 33.0 pg Final    MCHC 12/19/2023 35.0  31.5 - 35.7 g/dL Final    RDW 12/19/2023 12.3  12.3 - 15.4 % Final    RDW-SD 12/19/2023 37.1  37.0 - 54.0 fl Final    MPV 12/19/2023 10.2  6.0 - 12.0 fL Final    Platelets 12/19/2023 250  140 - 450 10*3/mm3 Final    Neutrophil % 12/19/2023 84.6 (H)  42.7 - 76.0 % Final    Lymphocyte % 12/19/2023 11.9 (L)  19.6 - 45.3 % Final    Monocyte % 12/19/2023 2.6 (L)  5.0 - 12.0 % Final    Eosinophil % 12/19/2023 0.0  (L)  0.3 - 6.2 % Final    Basophil % 12/19/2023 0.4  0.0 - 1.5 % Final    Immature Grans % 12/19/2023 0.5  0.0 - 0.5 % Final    Neutrophils, Absolute 12/19/2023 7.17 (H)  1.70 - 7.00 10*3/mm3 Final    Lymphocytes, Absolute 12/19/2023 1.01  0.70 - 3.10 10*3/mm3 Final    Monocytes, Absolute 12/19/2023 0.22  0.10 - 0.90 10*3/mm3 Final    Eosinophils, Absolute 12/19/2023 0.00  0.00 - 0.40 10*3/mm3 Final    Basophils, Absolute 12/19/2023 0.03  0.00 - 0.20 10*3/mm3 Final    Immature Grans, Absolute 12/19/2023 0.04  0.00 - 0.05 10*3/mm3 Final    nRBC 12/19/2023 0.0  0.0 - 0.2 /100 WBC Final    Extra Tube 12/19/2023 Hold for add-ons.   Final    Auto resulted.    Extra Tube 12/19/2023 hold for add-on   Final    Auto resulted    Extra Tube 12/19/2023 Hold for add-ons.   Final    Auto resulted.    Extra Tube 12/19/2023 Hold for add-ons.   Final    Auto resulted    RBC, UA 12/19/2023 Too Numerous to Count (A)  None Seen, 0-2 /HPF Final    WBC, UA 12/19/2023 0-2  None Seen, 0-2 /HPF Final    Urine culture not indicated.    Bacteria, UA 12/19/2023 None Seen  None Seen /HPF Final    Squamous Epithelial Cells, UA 12/19/2023 None Seen  None Seen, 0-2 /HPF Final    Hyaline Casts, UA 12/19/2023 None Seen  None Seen /LPF Final    Methodology 12/19/2023 Automated Microscopy   Final    THC, Screen, Urine 12/19/2023 Negative  Negative Final    Phencyclidine (PCP), Urine 12/19/2023 Negative  Negative Final    Cocaine Screen, Urine 12/19/2023 Negative  Negative Final    Methamphetamine, Ur 12/19/2023 Positive (A)  Negative Final    Opiate Screen 12/19/2023 Positive (A)  Negative Final    Amphetamine Screen, Urine 12/19/2023 Positive (A)  Negative Final    Benzodiazepine Screen, Urine 12/19/2023 Negative  Negative Final    Tricyclic Antidepressants Screen 12/19/2023 Negative  Negative Final    Methadone Screen, Urine 12/19/2023 Negative  Negative Final    Barbiturates Screen, Urine 12/19/2023 Negative  Negative Final    Oxycodone Screen,  Urine 12/19/2023 Negative  Negative Final    Buprenorphine, Screen, Urine 12/19/2023 Negative  Negative Final    Fentanyl, Urine 12/19/2023 Positive (A)  Negative Final        Procedure:       Assessment/Plan:   Renal calculus-we discussed the presence of the stone. We discussed the various therapeutic options available including percutaneous nephrostolithotomy, ureteroscopy and extracorporeal shockwave  lithotripsy.  We discussed the risks of lithotripsy including the passage of stones, the development of a large string of stones in the distal ureter known as Steinstrasse.  In the 3% incidence of that we will need to proceed with a ureteroscopy for obstructing fragments.  Extremely rare incidence of renal hematoma and the significance of this.  We discussed percutaneous nephrostolithotomy and its use as well as the risks and benefits such as the need for postoperative hospitalization, and the risk of damage to the kidney and the remote risk of a nephrectomy.  We also discussed the use of ureteroscopy in the upper tracts.  That this is as a decreased success rate to completely remove the stones on the first option and that very likely a stent will be required requiring an additional procedure for removal.  We discussed the absolute relative indicators for intervention including the presence of sepsis and pain we cannot control ais the primary need for urgent intervention.  We discussed placement of a stent if indicated and the management of the stent as well.  For lithotripsy on 126        This document has been electronically signed by CONSUELO MANRIQUE MD January 23, 2024 15:22 EST    Dictated Utilizing Dragon Dictation: Part of this note may be an electronic transcription/translation of spoken language to printed text using the Dragon Dictation System.

## 2024-01-26 ENCOUNTER — HOSPITAL ENCOUNTER (OUTPATIENT)
Facility: HOSPITAL | Age: 54
Setting detail: HOSPITAL OUTPATIENT SURGERY
Discharge: HOME | End: 2024-01-26
Attending: UROLOGY | Admitting: UROLOGY
Payer: COMMERCIAL

## 2024-01-26 ENCOUNTER — ANESTHESIA (OUTPATIENT)
Dept: PERIOP | Facility: HOSPITAL | Age: 54
End: 2024-01-26
Payer: COMMERCIAL

## 2024-01-26 ENCOUNTER — APPOINTMENT (OUTPATIENT)
Dept: GENERAL RADIOLOGY | Facility: HOSPITAL | Age: 54
End: 2024-01-26
Payer: COMMERCIAL

## 2024-01-26 ENCOUNTER — ANESTHESIA EVENT (OUTPATIENT)
Dept: PERIOP | Facility: HOSPITAL | Age: 54
End: 2024-01-26
Payer: COMMERCIAL

## 2024-01-26 VITALS
BODY MASS INDEX: 33.66 KG/M2 | SYSTOLIC BLOOD PRESSURE: 106 MMHG | OXYGEN SATURATION: 95 % | TEMPERATURE: 98.1 F | WEIGHT: 190 LBS | RESPIRATION RATE: 16 BRPM | HEART RATE: 78 BPM | DIASTOLIC BLOOD PRESSURE: 74 MMHG | HEIGHT: 63 IN

## 2024-01-26 DIAGNOSIS — N20.1 URETERAL CALCULUS: ICD-10-CM

## 2024-01-26 DIAGNOSIS — N20.1 RIGHT DISTAL URETERAL CALCULUS: Primary | ICD-10-CM

## 2024-01-26 PROCEDURE — 52351 CYSTOURETERO & OR PYELOSCOPE: CPT | Performed by: UROLOGY

## 2024-01-26 PROCEDURE — 74420 UROGRAPHY RTRGR +-KUB: CPT | Performed by: UROLOGY

## 2024-01-26 PROCEDURE — 25010000002 MEPERIDINE PER 100 MG: Performed by: NURSE ANESTHETIST, CERTIFIED REGISTERED

## 2024-01-26 PROCEDURE — 74018 RADEX ABDOMEN 1 VIEW: CPT | Performed by: RADIOLOGY

## 2024-01-26 PROCEDURE — 25010000002 FENTANYL CITRATE (PF) 50 MCG/ML SOLUTION: Performed by: NURSE ANESTHETIST, CERTIFIED REGISTERED

## 2024-01-26 PROCEDURE — 25010000002 PROPOFOL 200 MG/20ML EMULSION: Performed by: NURSE ANESTHETIST, CERTIFIED REGISTERED

## 2024-01-26 PROCEDURE — C1758 CATHETER, URETERAL: HCPCS | Performed by: UROLOGY

## 2024-01-26 PROCEDURE — 25010000002 MIDAZOLAM PER 1 MG: Performed by: NURSE ANESTHETIST, CERTIFIED REGISTERED

## 2024-01-26 PROCEDURE — 25810000003 LACTATED RINGERS PER 1000 ML: Performed by: ANESTHESIOLOGY

## 2024-01-26 PROCEDURE — 25510000001 IOPAMIDOL 61 % SOLUTION

## 2024-01-26 PROCEDURE — 25510000001 IOPAMIDOL 61 % SOLUTION: Performed by: UROLOGY

## 2024-01-26 PROCEDURE — 50590 FRAGMENTING OF KIDNEY STONE: CPT | Performed by: UROLOGY

## 2024-01-26 PROCEDURE — 25010000002 ONDANSETRON PER 1 MG: Performed by: NURSE ANESTHETIST, CERTIFIED REGISTERED

## 2024-01-26 PROCEDURE — 74018 RADEX ABDOMEN 1 VIEW: CPT

## 2024-01-26 RX ORDER — PROPOFOL 10 MG/ML
INJECTION, EMULSION INTRAVENOUS AS NEEDED
Status: DISCONTINUED | OUTPATIENT
Start: 2024-01-26 | End: 2024-01-26 | Stop reason: SURG

## 2024-01-26 RX ORDER — ONDANSETRON 2 MG/ML
INJECTION INTRAMUSCULAR; INTRAVENOUS AS NEEDED
Status: DISCONTINUED | OUTPATIENT
Start: 2024-01-26 | End: 2024-01-26 | Stop reason: SURG

## 2024-01-26 RX ORDER — OXYCODONE AND ACETAMINOPHEN 10; 325 MG/1; MG/1
1 TABLET ORAL EVERY 6 HOURS PRN
Qty: 10 TABLET | Refills: 0 | Status: SHIPPED | OUTPATIENT
Start: 2024-01-26

## 2024-01-26 RX ORDER — SODIUM CHLORIDE 9 MG/ML
40 INJECTION, SOLUTION INTRAVENOUS AS NEEDED
Status: DISCONTINUED | OUTPATIENT
Start: 2024-01-26 | End: 2024-01-26 | Stop reason: HOSPADM

## 2024-01-26 RX ORDER — FAMOTIDINE 10 MG/ML
INJECTION, SOLUTION INTRAVENOUS AS NEEDED
Status: DISCONTINUED | OUTPATIENT
Start: 2024-01-26 | End: 2024-01-26 | Stop reason: SURG

## 2024-01-26 RX ORDER — KETOROLAC TROMETHAMINE 30 MG/ML
30 INJECTION, SOLUTION INTRAMUSCULAR; INTRAVENOUS EVERY 6 HOURS PRN
Status: DISCONTINUED | OUTPATIENT
Start: 2024-01-26 | End: 2024-01-26 | Stop reason: HOSPADM

## 2024-01-26 RX ORDER — MIDAZOLAM HYDROCHLORIDE 1 MG/ML
INJECTION INTRAMUSCULAR; INTRAVENOUS AS NEEDED
Status: DISCONTINUED | OUTPATIENT
Start: 2024-01-26 | End: 2024-01-26 | Stop reason: SURG

## 2024-01-26 RX ORDER — SODIUM CHLORIDE 0.9 % (FLUSH) 0.9 %
10 SYRINGE (ML) INJECTION AS NEEDED
Status: DISCONTINUED | OUTPATIENT
Start: 2024-01-26 | End: 2024-01-26 | Stop reason: HOSPADM

## 2024-01-26 RX ORDER — IPRATROPIUM BROMIDE AND ALBUTEROL SULFATE 2.5; .5 MG/3ML; MG/3ML
3 SOLUTION RESPIRATORY (INHALATION) ONCE AS NEEDED
Status: DISCONTINUED | OUTPATIENT
Start: 2024-01-26 | End: 2024-01-26 | Stop reason: HOSPADM

## 2024-01-26 RX ORDER — SODIUM CHLORIDE 0.9 % (FLUSH) 0.9 %
10 SYRINGE (ML) INJECTION EVERY 12 HOURS SCHEDULED
Status: DISCONTINUED | OUTPATIENT
Start: 2024-01-26 | End: 2024-01-26 | Stop reason: HOSPADM

## 2024-01-26 RX ORDER — FENTANYL CITRATE 50 UG/ML
INJECTION, SOLUTION INTRAMUSCULAR; INTRAVENOUS AS NEEDED
Status: DISCONTINUED | OUTPATIENT
Start: 2024-01-26 | End: 2024-01-26 | Stop reason: SURG

## 2024-01-26 RX ORDER — LIDOCAINE HYDROCHLORIDE 20 MG/ML
JELLY TOPICAL AS NEEDED
Status: DISCONTINUED | OUTPATIENT
Start: 2024-01-26 | End: 2024-01-26 | Stop reason: HOSPADM

## 2024-01-26 RX ORDER — MAGNESIUM HYDROXIDE 1200 MG/15ML
LIQUID ORAL AS NEEDED
Status: DISCONTINUED | OUTPATIENT
Start: 2024-01-26 | End: 2024-01-26 | Stop reason: HOSPADM

## 2024-01-26 RX ORDER — FENTANYL CITRATE 50 UG/ML
50 INJECTION, SOLUTION INTRAMUSCULAR; INTRAVENOUS
Status: DISCONTINUED | OUTPATIENT
Start: 2024-01-26 | End: 2024-01-26 | Stop reason: HOSPADM

## 2024-01-26 RX ORDER — MEPERIDINE HYDROCHLORIDE 25 MG/ML
12.5 INJECTION INTRAMUSCULAR; INTRAVENOUS; SUBCUTANEOUS
Status: DISCONTINUED | OUTPATIENT
Start: 2024-01-26 | End: 2024-01-26 | Stop reason: HOSPADM

## 2024-01-26 RX ORDER — ONDANSETRON 2 MG/ML
4 INJECTION INTRAMUSCULAR; INTRAVENOUS AS NEEDED
Status: DISCONTINUED | OUTPATIENT
Start: 2024-01-26 | End: 2024-01-26 | Stop reason: HOSPADM

## 2024-01-26 RX ORDER — LIDOCAINE HYDROCHLORIDE 20 MG/ML
INJECTION, SOLUTION EPIDURAL; INFILTRATION; INTRACAUDAL; PERINEURAL AS NEEDED
Status: DISCONTINUED | OUTPATIENT
Start: 2024-01-26 | End: 2024-01-26 | Stop reason: SURG

## 2024-01-26 RX ORDER — OXYCODONE HYDROCHLORIDE AND ACETAMINOPHEN 5; 325 MG/1; MG/1
1 TABLET ORAL ONCE AS NEEDED
Status: COMPLETED | OUTPATIENT
Start: 2024-01-26 | End: 2024-01-26

## 2024-01-26 RX ORDER — SODIUM CHLORIDE, SODIUM LACTATE, POTASSIUM CHLORIDE, CALCIUM CHLORIDE 600; 310; 30; 20 MG/100ML; MG/100ML; MG/100ML; MG/100ML
100 INJECTION, SOLUTION INTRAVENOUS ONCE AS NEEDED
Status: DISCONTINUED | OUTPATIENT
Start: 2024-01-26 | End: 2024-01-26 | Stop reason: HOSPADM

## 2024-01-26 RX ORDER — SODIUM CHLORIDE, SODIUM LACTATE, POTASSIUM CHLORIDE, CALCIUM CHLORIDE 600; 310; 30; 20 MG/100ML; MG/100ML; MG/100ML; MG/100ML
125 INJECTION, SOLUTION INTRAVENOUS ONCE
Status: COMPLETED | OUTPATIENT
Start: 2024-01-26 | End: 2024-01-26

## 2024-01-26 RX ORDER — MIDAZOLAM HYDROCHLORIDE 1 MG/ML
1 INJECTION INTRAMUSCULAR; INTRAVENOUS
Status: DISCONTINUED | OUTPATIENT
Start: 2024-01-26 | End: 2024-01-26 | Stop reason: HOSPADM

## 2024-01-26 RX ADMIN — FENTANYL CITRATE 50 MCG: 50 INJECTION, SOLUTION INTRAMUSCULAR; INTRAVENOUS at 10:33

## 2024-01-26 RX ADMIN — PROPOFOL 200 MG: 10 INJECTION, EMULSION INTRAVENOUS at 09:18

## 2024-01-26 RX ADMIN — MEPERIDINE HYDROCHLORIDE 12.5 MG: 25 INJECTION INTRAMUSCULAR; INTRAVENOUS; SUBCUTANEOUS at 10:39

## 2024-01-26 RX ADMIN — LIDOCAINE HYDROCHLORIDE 60 MG: 20 INJECTION, SOLUTION EPIDURAL; INFILTRATION; INTRACAUDAL; PERINEURAL at 09:18

## 2024-01-26 RX ADMIN — FENTANYL CITRATE 100 MCG: 50 INJECTION INTRAMUSCULAR; INTRAVENOUS at 09:14

## 2024-01-26 RX ADMIN — PROPOFOL 100 MG: 10 INJECTION, EMULSION INTRAVENOUS at 09:29

## 2024-01-26 RX ADMIN — ONDANSETRON 4 MG: 2 INJECTION INTRAMUSCULAR; INTRAVENOUS at 09:14

## 2024-01-26 RX ADMIN — SODIUM CHLORIDE, POTASSIUM CHLORIDE, SODIUM LACTATE AND CALCIUM CHLORIDE: 600; 310; 30; 20 INJECTION, SOLUTION INTRAVENOUS at 09:14

## 2024-01-26 RX ADMIN — MIDAZOLAM 2 MG: 1 INJECTION INTRAMUSCULAR; INTRAVENOUS at 09:14

## 2024-01-26 RX ADMIN — OXYCODONE AND ACETAMINOPHEN 1 TABLET: 5; 325 TABLET ORAL at 10:33

## 2024-01-26 RX ADMIN — FAMOTIDINE 20 MG: 10 INJECTION, SOLUTION INTRAVENOUS at 09:14

## 2024-01-26 NOTE — ANESTHESIA POSTPROCEDURE EVALUATION
Patient: Lizbeth Ramirez    Procedure Summary       Date: 01/26/24 Room / Location: [facility] OR 09 / [facility] OR    Anesthesia Start: 0914 Anesthesia Stop: 0952    Procedures:       EXTRACORPOREAL SHOCKWAVE LITHOTRIPSY (Right)      CYSTOSCOPY RETROGRADE PYELOGRAM (Left) Diagnosis:       Ureteral calculus      (Ureteral calculus [N20.1])    Surgeons: Prosper Crespo MD Provider: Davidson Montgomery MD    Anesthesia Type: general ASA Status: 2            Anesthesia Type: general    Vitals  Vitals Value Taken Time   BP 99/68 01/26/24 1020   Temp 98.3 °F (36.8 °C) 01/26/24 0952   Pulse 92 01/26/24 1022   Resp 16 01/26/24 1017   SpO2 93 % 01/26/24 1022   Vitals shown include unfiled device data.        Post Anesthesia Care and Evaluation    Patient location during evaluation: PACU  Patient participation: complete - patient participated  Level of consciousness: awake  Pain score: 0  Pain management: satisfactory to patient    Airway patency: patent  Anesthetic complications: No anesthetic complications  PONV Status: none  Cardiovascular status: hemodynamically stable  Respiratory status: nasal cannula  Hydration status: acceptable

## 2024-01-26 NOTE — ANESTHESIA PROCEDURE NOTES
Airway  Urgency: elective    Date/Time: 1/26/2024 9:18 AM  Airway not difficult    General Information and Staff    Patient location during procedure: OR  Anesthesiologist: Davidson Montgomery MD  CRNA/CAA: Jacki Hutchison CRNA    Indications and Patient Condition  Indications for airway management: airway protection    Preoxygenated: yes  Mask difficulty assessment: 0 - not attempted    Final Airway Details  Final airway type: supraglottic airway      Successful airway: classic  Size 4     Number of attempts at approach: 1  Assessment: lips, teeth, and gum same as pre-op    Additional Comments  LMA placed with no trauma noted. Patient tolerated well. Good seal. Secured.

## 2024-01-26 NOTE — ANESTHESIA PREPROCEDURE EVALUATION
Anesthesia Evaluation     Patient summary reviewed and Nursing notes reviewed   no history of anesthetic complications:   NPO Solid Status: > 8 hours  NPO Liquid Status: > 6 hours           Airway   Mallampati: II  TM distance: >3 FB  Neck ROM: full  Dental    (+) lower dentures    Pulmonary - negative pulmonary ROS    breath sounds clear to auscultation  Cardiovascular   Exercise tolerance: good (4-7 METS)    ECG reviewed  Rhythm: regular  Rate: normal        Neuro/Psych  (+) psychiatric history Anxiety  GI/Hepatic/Renal/Endo    (+) renal disease- stones    Musculoskeletal (-) negative ROS    Abdominal   (+) obese    Abdomen: soft.   Substance History - negative use     OB/GYN negative ob/gyn ROS         Other - negative ROS       ROS/Med Hx Other:   Normal sinus rhythm  Voltage criteria for left ventricular hypertrophy  Abnormal ECG  No previous ECGs available  Confirmed by Guero Sharma ([id number]) on 3/12/2022 10:11:47 AM                   Anesthesia Plan    ASA 2     general     intravenous induction     Anesthetic plan, risks, benefits, and alternatives have been provided, discussed and informed consent has been obtained with: patient.  Pre-procedure education provided  Use of blood products discussed with  Consented to blood products.    Plan discussed with CRNA.    CODE STATUS:

## 2024-01-26 NOTE — OP NOTE
jeol Ramirez  1/26/2024    Pre-op Diagnosis:   Ureteral calculus [N20.1]    Post-op Diagnosis:     Post-Op Diagnosis Codes:     * Ureteral calculus [N20.1]    Procedure/CPT® Codes:  53-year-old white female presented with a right distal stone and was found to have 2 stones 1 in each kidney.  She has had considerable colicky flank pain she was seen in the office this week and a KUB showed a stone on the left about 4 mm that appeared to be moving KUB today showed that stone on the left has probably passed but she never recovered at today regarding crush the right-sided stones in the lower pole I do a cystoscopy retrograde to rule out spastic stone following an informed consent.  ESWL-the patient is a candidate for extracorporeal shockwave lithotripsy.  We discussed the type of stone and the complications associated with the procedure including, but not limited to, pain in the flank, hematoma, spontaneous renal hemorrhage, inadequate fragmentation of stones, the need for passage of the stones, the need for concomitant additional procedures in the range of 24%, the risk of a distal fragment in the range of 3% requiring ureteroscopic removal, and the fact that sometimes a stent is indicated based on the size and the density of the stone as determined on the CAT scan.  Additionally, we discussed percutaneous nephrostolithotomy.  Including the mini PERC.  With its attendant risks of anesthesia bleeding infection and the fact that is a invasive procedure with the remote possibility of a nephrectomy.  We also discussed the use of ureteroscopy which is a rigid or flexible instrument placed up into the kidney to break up stones with the laser beam and very likely a postop stent and a high likelihood of additional concomitant procedures.  Following an informed consent, he was brought to the operative suite and underwent induction of general endotracheal anesthetic.  The stone was localized at F2 and a total of 3000  shockwaves was administered without complication.  There was excellent fragmentation I then put her in the low dorsolithotomy position she had a normal bladder I went ahead and did a careful retrograde with a 5 Brazilian Pollick catheter she had a dilated ureter and redness consistent with passed the stone there was no endoluminal filling defects and clear emptying by bolus contraction it appears she has passed the stone on the left as well    Procedure(s):  EXTRACORPOREAL SHOCKWAVE LITHOTRIPSY-right  CYSTOSCOPY RETROGRADE PYELOGRAM-Left  Fluoroscopic monitoring less than 30 minutes    Surgeon(s):  Prosper Crespo MD    Anesthesia: see anesthesia record    Staff:   Circulator: Latonia Hoffman RN  Scrub Person: Lesley Montague  Assistant: Adelia Glass LPN    Estimated Blood Loss: none  Urine Voided: * No values recorded between 1/26/2024  9:13 AM and 1/26/2024  9:39 AM *    Specimens:                None      Drains: None    Findings:  Fragmentation on right, passed stone on left     Blood: N/A    Complications: None    Grafts and Implants: None    Prosper Crespo MD     Date: 1/26/2024  Time: 09:39 EST

## 2024-02-05 PROBLEM — N20.0 RENAL CALCULUS, BILATERAL: Status: ACTIVE | Noted: 2024-02-05

## 2024-04-01 ENCOUNTER — TELEPHONE (OUTPATIENT)
Dept: UROLOGY | Facility: CLINIC | Age: 54
End: 2024-04-01

## 2024-04-01 NOTE — TELEPHONE ENCOUNTER
Caller: Lizbeth Ramirez     What was the call regarding: INCOMING CALL FROM PT STATING THAT SHE IS WAITING ON HER INSURANCE TO BECOME ACTIVE BEFORE SHE CAN SCHEDULE A FOLLOW UP WITH DR. MANRIQUE.

## (undated) DEVICE — FIBR LASR FLEXIVAPULSE365 HOLMIUM FLT/TP 1P/U

## (undated) DEVICE — HOLDER: Brand: DEROYAL

## (undated) DEVICE — MONOPOLAR METZENBAUM SCISSOR TIP, DISPOSABLE: Brand: MONOPOLAR METZENBAUM SCISSOR TIP, DISPOSABLE

## (undated) DEVICE — THE ECHELON FLEX POWERED PLUS ARTICULATING ENDOSCOPIC LINEAR CUTTERS ARE STERILE, SINGLE PATIENT USE INSTRUMENTS THAT SIMULTANEOUSLYCUT AND STAPLE TISSUE. THERE ARE SIX STAGGERED ROWS OF STAPLES, THREE ON EITHER SIDE OF THE CUT LINE. THE ECHELON FLEX 45 POWERED PLUSINSTRUMENTS HAVE A STAPLE LINE THAT IS APPROXIMATELY 45 MM LONG AND A CUT LINE THAT IS APPROXIMATELY 42 MM LONG. THE SHAFT CAN ROTATE FREELYIN BOTH DIRECTIONS AND AN ARTICULATION MECHANISM ENABLES THE DISTAL PORTION OF THE SHAFT TO PIVOT TO FACILITATE LATERAL ACCESS TO THE OPERATIVESITE.THE INSTRUMENTS ARE PACKAGED WITH A PRIMARY LITHIUM BATTERY PACK THAT MUST BE INSTALLED PRIOR TO USE. THERE ARE SPECIFIC REQUIREMENTS FORDISPOSING OF THE BATTERY PACK. REFER TO THE BATTERY PACK DISPOSAL SECTION.THE INSTRUMENTS ARE PACKAGED WITHOUT A RELOAD AND MUST BE LOADED PRIOR TO USE. A STAPLE RETAINING CAP ON THE RELOAD PROTECTS THE STAPLE LEGPOINTS DURING SHIPPING AND TRANSPORTATION. THE INSTRUMENTS’ LOCK-OUT FEATURE IS DESIGNED TO PREVENT A USED OR IMPROPERLY INSTALLED RELOADFROM BEING REFIRED OR AN INSTRUMENT FROM BEING FIRED WITHOUT A RELOAD.: Brand: ECHELON FLEX

## (undated) DEVICE — ENDOPOUCH RETRIEVER SPECIMEN RETRIEVAL BAGS: Brand: ENDOPOUCH RETRIEVER

## (undated) DEVICE — ELECTRD NDL MEGADYNE EZCLEAN NOSE 7CM

## (undated) DEVICE — HARMONIC ACE +7 LAPAROSCOPIC SHEARS ADVANCED HEMOSTASIS 5MM DIAMETER 36CM SHAFT LENGTH  FOR USE WITH GRAY HAND PIECE ONLY: Brand: HARMONIC ACE

## (undated) DEVICE — PK CYSTO 70

## (undated) DEVICE — ENDOPATH XCEL BLADELESS TROCARS WITH STABILITY SLEEVES: Brand: ENDOPATH XCEL

## (undated) DEVICE — GLV SURG PREMIERPRO MIC LTX PF SZ7 BRN

## (undated) DEVICE — UNDYED BRAIDED (POLYGLACTIN 910), SYNTHETIC ABSORBABLE SUTURE: Brand: COATED VICRYL

## (undated) DEVICE — DIL URETH AQ LNG/TPR 6F 12X60CM

## (undated) DEVICE — TBG PENCL TELESCP MEGADYNE SMOKE EVAC 10FT

## (undated) DEVICE — DRP UTIL 2/LAYR W/TP 15X26IN STRL PK/4

## (undated) DEVICE — ENDOPATH XCEL UNIVERSAL TROCAR STABLILITY SLEEVES: Brand: ENDOPATH XCEL

## (undated) DEVICE — NITINOL STONE RETRIEVAL BASKET: Brand: ZERO TIP

## (undated) DEVICE — GW ZIPWIRE STD/SHFT STR TPR/3CM .035IN 150CM

## (undated) DEVICE — COR CYSTO: Brand: MEDLINE INDUSTRIES, INC.

## (undated) DEVICE — 2, DISPOSABLE SUCTION/IRRIGATOR WITH DISPOSABLE TIP: Brand: STRYKEFLOW

## (undated) DEVICE — ABSORBABLE HEMOSTAT (OXIDIZED REGENERATED CELLULOSE, U.S.P.)
Type: IMPLANTABLE DEVICE | Site: ABDOMEN | Status: NON-FUNCTIONAL
Brand: SURGICEL
Removed: 2022-03-12

## (undated) DEVICE — ST TBG PNEUMOCLEAR EVAC SMOKE HIFLO

## (undated) DEVICE — PK LAP GEN 70

## (undated) DEVICE — TROCAR: Brand: KII FIOS FIRST ENTRY

## (undated) DEVICE — ELECTRD BLD EZ CLN MOD 4IN

## (undated) DEVICE — SUT MNCRYL PLS ANTIB UD 4/0 PS2 18IN

## (undated) DEVICE — TROCAR: Brand: KII SLEEVE

## (undated) DEVICE — INSUFFLATION NEEDLE TO ESTABLISH PNEUMOPERITONEUM.: Brand: INSUFFLATION NEEDLE

## (undated) DEVICE — CATH URETRL FLXITP POLLACK STD 5F 70CM

## (undated) DEVICE — LAPAROSCOPIC DISSECTOR: Brand: DEROYAL